# Patient Record
Sex: FEMALE | Race: WHITE | NOT HISPANIC OR LATINO | Employment: FULL TIME | URBAN - METROPOLITAN AREA
[De-identification: names, ages, dates, MRNs, and addresses within clinical notes are randomized per-mention and may not be internally consistent; named-entity substitution may affect disease eponyms.]

---

## 2023-09-18 ENCOUNTER — HOSPITAL ENCOUNTER (OUTPATIENT)
Dept: DATA CONVERSION | Facility: HOSPITAL | Age: 38
Discharge: HOME | End: 2023-09-18
Payer: COMMERCIAL

## 2023-09-18 DIAGNOSIS — E78.5 HYPERLIPIDEMIA, UNSPECIFIED: ICD-10-CM

## 2023-09-18 LAB
APPEARANCE PLAS: NORMAL
CHOLEST SERPL-MCNC: 194 MG/DL (ref 133–200)
CHOLEST/HDLC SERPL: 3.5 RATIO
COLOR SPUN FLD: NORMAL
FASTING STATUS PATIENT QL REPORTED: NORMAL
HDLC SERPL-MCNC: 55 MG/DL
LDLC SERPL CALC-MCNC: 112 MG/DL (ref 65–130)
TRIGL SERPL-MCNC: 137 MG/DL (ref 40–150)

## 2023-11-24 DIAGNOSIS — G43.829 MENSTRUAL MIGRAINE, NOT INTRACTABLE, WITHOUT STATUS MIGRAINOSUS: ICD-10-CM

## 2023-11-24 RX ORDER — SUMATRIPTAN SUCCINATE 100 MG/1
TABLET ORAL
Qty: 27 TABLET | Refills: 1 | Status: SHIPPED | OUTPATIENT
Start: 2023-11-24 | End: 2024-04-01 | Stop reason: SDUPTHER

## 2024-01-08 ENCOUNTER — TELEPHONE (OUTPATIENT)
Dept: PRIMARY CARE | Facility: CLINIC | Age: 39
End: 2024-01-08
Payer: COMMERCIAL

## 2024-01-08 NOTE — TELEPHONE ENCOUNTER
"Pt states she has an order for labs for her 4/1/24 CPE.  Asking if can get additional labs to \"check hormones.\"  States she is perimenopausal and is getting arthritis that she believes may be related to hormones.  Please advise.  Ph: [phone]      "

## 2024-03-22 PROBLEM — R00.2 PALPITATIONS: Status: ACTIVE | Noted: 2024-03-22

## 2024-03-22 PROBLEM — J45.20 MILD INTERMITTENT ASTHMA WITHOUT COMPLICATION (HHS-HCC): Status: ACTIVE | Noted: 2024-03-22

## 2024-03-22 PROBLEM — G43.829 MENSTRUAL MIGRAINE WITHOUT STATUS MIGRAINOSUS, NOT INTRACTABLE: Status: ACTIVE | Noted: 2024-03-22

## 2024-03-22 PROBLEM — E78.00 PURE HYPERCHOLESTEROLEMIA: Status: ACTIVE | Noted: 2024-03-22

## 2024-03-22 PROBLEM — E55.9 VITAMIN D DEFICIENCY: Status: ACTIVE | Noted: 2024-03-22

## 2024-03-22 PROBLEM — E66.9 OBESITY: Status: ACTIVE | Noted: 2024-03-22

## 2024-03-22 PROBLEM — I49.1 PAC (PREMATURE ATRIAL CONTRACTION): Status: ACTIVE | Noted: 2024-03-22

## 2024-03-22 PROBLEM — I49.3 PREMATURE VENTRICULAR CONTRACTIONS: Status: ACTIVE | Noted: 2024-03-22

## 2024-03-22 PROBLEM — E03.9 HYPOTHYROIDISM: Status: ACTIVE | Noted: 2024-03-22

## 2024-03-22 RX ORDER — LORATADINE 10 MG/1
TABLET ORAL
COMMUNITY

## 2024-03-22 RX ORDER — ALBUTEROL SULFATE 90 UG/1
AEROSOL, METERED RESPIRATORY (INHALATION) EVERY 4 HOURS
COMMUNITY

## 2024-03-22 RX ORDER — NORETHINDRONE ACETATE AND ETHINYL ESTRADIOL AND FERROUS FUMARATE 1MG-20(21)
1 KIT ORAL
COMMUNITY
Start: 2023-05-08

## 2024-03-22 RX ORDER — FLUTICASONE PROPIONATE 50 MCG
SPRAY, SUSPENSION (ML) NASAL
COMMUNITY

## 2024-03-22 RX ORDER — HYDROCORTISONE, IODOQUINOL 10; 10 MG/G; MG/G
CREAM TOPICAL
COMMUNITY
Start: 2023-04-25 | End: 2024-04-01 | Stop reason: ALTCHOICE

## 2024-03-25 ENCOUNTER — LAB (OUTPATIENT)
Dept: LAB | Facility: LAB | Age: 39
End: 2024-03-25
Payer: COMMERCIAL

## 2024-03-25 DIAGNOSIS — E55.9 VITAMIN D DEFICIENCY, UNSPECIFIED: ICD-10-CM

## 2024-03-25 DIAGNOSIS — R73.9 HYPERGLYCEMIA, UNSPECIFIED: ICD-10-CM

## 2024-03-25 DIAGNOSIS — Z01.89 ENCOUNTER FOR OTHER SPECIFIED SPECIAL EXAMINATIONS: Primary | ICD-10-CM

## 2024-03-25 DIAGNOSIS — R39.9 UNSPECIFIED SYMPTOMS AND SIGNS INVOLVING THE GENITOURINARY SYSTEM: ICD-10-CM

## 2024-03-25 DIAGNOSIS — E78.00 PURE HYPERCHOLESTEROLEMIA, UNSPECIFIED: ICD-10-CM

## 2024-03-25 DIAGNOSIS — E03.9 HYPOTHYROIDISM, UNSPECIFIED: ICD-10-CM

## 2024-03-25 LAB
25(OH)D3 SERPL-MCNC: 35 NG/ML (ref 31–100)
ALBUMIN SERPL-MCNC: 4.3 G/DL (ref 3.5–5)
ALP BLD-CCNC: 63 U/L (ref 35–125)
ALT SERPL-CCNC: 15 U/L (ref 5–40)
ANION GAP SERPL CALC-SCNC: 13 MMOL/L
APPEARANCE UR: CLEAR
AST SERPL-CCNC: 14 U/L (ref 5–40)
BASOPHILS # BLD AUTO: 0.02 X10*3/UL (ref 0–0.1)
BASOPHILS NFR BLD AUTO: 0.4 %
BILIRUB DIRECT SERPL-MCNC: <0.2 MG/DL (ref 0–0.2)
BILIRUB SERPL-MCNC: 0.3 MG/DL (ref 0.1–1.2)
BILIRUB UR STRIP.AUTO-MCNC: NEGATIVE MG/DL
BUN SERPL-MCNC: 17 MG/DL (ref 8–25)
CALCIUM SERPL-MCNC: 9.3 MG/DL (ref 8.5–10.4)
CHLORIDE SERPL-SCNC: 106 MMOL/L (ref 97–107)
CHOLEST SERPL-MCNC: 202 MG/DL (ref 133–200)
CHOLEST/HDLC SERPL: 3.2 {RATIO}
CO2 SERPL-SCNC: 23 MMOL/L (ref 24–31)
COLOR UR: YELLOW
CREAT SERPL-MCNC: 1 MG/DL (ref 0.4–1.6)
EGFRCR SERPLBLD CKD-EPI 2021: 74 ML/MIN/1.73M*2
EOSINOPHIL # BLD AUTO: 0.06 X10*3/UL (ref 0–0.7)
EOSINOPHIL NFR BLD AUTO: 1.1 %
ERYTHROCYTE [DISTWIDTH] IN BLOOD BY AUTOMATED COUNT: 12.1 % (ref 11.5–14.5)
EST. AVERAGE GLUCOSE BLD GHB EST-MCNC: 103 MG/DL
GLUCOSE SERPL-MCNC: 103 MG/DL (ref 65–99)
GLUCOSE UR STRIP.AUTO-MCNC: NORMAL MG/DL
HBA1C MFR BLD: 5.2 %
HCT VFR BLD AUTO: 41.2 % (ref 36–46)
HDLC SERPL-MCNC: 63 MG/DL
HGB BLD-MCNC: 13.1 G/DL (ref 12–16)
IMM GRANULOCYTES # BLD AUTO: 0.01 X10*3/UL (ref 0–0.7)
IMM GRANULOCYTES NFR BLD AUTO: 0.2 % (ref 0–0.9)
KETONES UR STRIP.AUTO-MCNC: NEGATIVE MG/DL
LDLC SERPL CALC-MCNC: 116 MG/DL (ref 65–130)
LEUKOCYTE ESTERASE UR QL STRIP.AUTO: NEGATIVE
LYMPHOCYTES # BLD AUTO: 2.06 X10*3/UL (ref 1.2–4.8)
LYMPHOCYTES NFR BLD AUTO: 36.3 %
MCH RBC QN AUTO: 28.8 PG (ref 26–34)
MCHC RBC AUTO-ENTMCNC: 31.8 G/DL (ref 32–36)
MCV RBC AUTO: 91 FL (ref 80–100)
MONOCYTES # BLD AUTO: 0.45 X10*3/UL (ref 0.1–1)
MONOCYTES NFR BLD AUTO: 7.9 %
NEUTROPHILS # BLD AUTO: 3.07 X10*3/UL (ref 1.2–7.7)
NEUTROPHILS NFR BLD AUTO: 54.1 %
NITRITE UR QL STRIP.AUTO: NEGATIVE
NRBC BLD-RTO: 0 /100 WBCS (ref 0–0)
PH UR STRIP.AUTO: 5.5 [PH]
PLATELET # BLD AUTO: 246 X10*3/UL (ref 150–450)
POTASSIUM SERPL-SCNC: 4.2 MMOL/L (ref 3.4–5.1)
PROT SERPL-MCNC: 7.1 G/DL (ref 5.9–7.9)
PROT UR STRIP.AUTO-MCNC: NEGATIVE MG/DL
RBC # BLD AUTO: 4.55 X10*6/UL (ref 4–5.2)
RBC # UR STRIP.AUTO: NEGATIVE /UL
SODIUM SERPL-SCNC: 142 MMOL/L (ref 133–145)
SP GR UR STRIP.AUTO: 1.02
TRIGL SERPL-MCNC: 114 MG/DL (ref 40–150)
TSH SERPL DL<=0.05 MIU/L-ACNC: 2.62 MIU/L (ref 0.27–4.2)
UROBILINOGEN UR STRIP.AUTO-MCNC: NORMAL MG/DL
WBC # BLD AUTO: 5.7 X10*3/UL (ref 4.4–11.3)

## 2024-03-25 PROCEDURE — 80053 COMPREHEN METABOLIC PANEL: CPT

## 2024-03-25 PROCEDURE — 80061 LIPID PANEL: CPT

## 2024-03-25 PROCEDURE — 84443 ASSAY THYROID STIM HORMONE: CPT

## 2024-03-25 PROCEDURE — 36415 COLL VENOUS BLD VENIPUNCTURE: CPT

## 2024-03-25 PROCEDURE — 83036 HEMOGLOBIN GLYCOSYLATED A1C: CPT

## 2024-03-25 PROCEDURE — 85025 COMPLETE CBC W/AUTO DIFF WBC: CPT

## 2024-03-25 PROCEDURE — 81003 URINALYSIS AUTO W/O SCOPE: CPT

## 2024-03-25 PROCEDURE — 82306 VITAMIN D 25 HYDROXY: CPT

## 2024-03-25 PROCEDURE — 82248 BILIRUBIN DIRECT: CPT

## 2024-03-27 PROBLEM — E66.01 MORBID OBESITY (MULTI): Status: ACTIVE | Noted: 2024-03-22

## 2024-03-31 ASSESSMENT — PROMIS GLOBAL HEALTH SCALE
EMOTIONAL_PROBLEMS: OFTEN
RATE_SOCIAL_SATISFACTION: GOOD
RATE_PHYSICAL_HEALTH: GOOD
RATE_MENTAL_HEALTH: GOOD
CARRYOUT_SOCIAL_ACTIVITIES: VERY GOOD
CARRYOUT_PHYSICAL_ACTIVITIES: COMPLETELY
RATE_GENERAL_HEALTH: GOOD
RATE_AVERAGE_FATIGUE: SEVERE
RATE_AVERAGE_PAIN: 0
RATE_QUALITY_OF_LIFE: GOOD

## 2024-04-01 ENCOUNTER — OFFICE VISIT (OUTPATIENT)
Dept: PRIMARY CARE | Facility: CLINIC | Age: 39
End: 2024-04-01
Payer: COMMERCIAL

## 2024-04-01 ENCOUNTER — LAB (OUTPATIENT)
Dept: LAB | Facility: LAB | Age: 39
End: 2024-04-01
Payer: COMMERCIAL

## 2024-04-01 VITALS
OXYGEN SATURATION: 100 % | DIASTOLIC BLOOD PRESSURE: 80 MMHG | HEART RATE: 65 BPM | TEMPERATURE: 97.8 F | WEIGHT: 177 LBS | HEIGHT: 64 IN | SYSTOLIC BLOOD PRESSURE: 124 MMHG | BODY MASS INDEX: 30.22 KG/M2

## 2024-04-01 DIAGNOSIS — Z00.00 ENCOUNTER FOR WELLNESS EXAMINATION: Primary | ICD-10-CM

## 2024-04-01 DIAGNOSIS — Z01.89 ENCOUNTER FOR ROUTINE LABORATORY TESTING: ICD-10-CM

## 2024-04-01 DIAGNOSIS — E55.9 VITAMIN D DEFICIENCY: ICD-10-CM

## 2024-04-01 DIAGNOSIS — N92.6 IRREGULAR MENSES: ICD-10-CM

## 2024-04-01 DIAGNOSIS — Z11.59 NEED FOR HEPATITIS C SCREENING TEST: ICD-10-CM

## 2024-04-01 DIAGNOSIS — J45.20 MILD INTERMITTENT ASTHMA WITHOUT COMPLICATION (HHS-HCC): ICD-10-CM

## 2024-04-01 DIAGNOSIS — L30.9 ECZEMA, UNSPECIFIED TYPE: ICD-10-CM

## 2024-04-01 DIAGNOSIS — R73.01 ELEVATED FASTING GLUCOSE: ICD-10-CM

## 2024-04-01 DIAGNOSIS — Z23 NEED FOR HEPATITIS B VACCINATION: ICD-10-CM

## 2024-04-01 DIAGNOSIS — Z12.31 ENCOUNTER FOR SCREENING MAMMOGRAM FOR BREAST CANCER: ICD-10-CM

## 2024-04-01 DIAGNOSIS — G43.829 MENSTRUAL MIGRAINE WITHOUT STATUS MIGRAINOSUS, NOT INTRACTABLE: ICD-10-CM

## 2024-04-01 DIAGNOSIS — G43.829 MENSTRUAL MIGRAINE, NOT INTRACTABLE, WITHOUT STATUS MIGRAINOSUS: ICD-10-CM

## 2024-04-01 DIAGNOSIS — E78.00 PURE HYPERCHOLESTEROLEMIA: ICD-10-CM

## 2024-04-01 PROBLEM — E03.9 HYPOTHYROIDISM: Status: RESOLVED | Noted: 2024-03-22 | Resolved: 2024-04-01

## 2024-04-01 LAB
HCV AB SER QL: NONREACTIVE
PROGEST SERPL-MCNC: <0.3 NG/ML
PROLACTIN SERPL-MCNC: 6.1 UG/L (ref 3–20)

## 2024-04-01 PROCEDURE — 86803 HEPATITIS C AB TEST: CPT

## 2024-04-01 PROCEDURE — 99395 PREV VISIT EST AGE 18-39: CPT | Performed by: INTERNAL MEDICINE

## 2024-04-01 PROCEDURE — 90739 HEPB VACC 2/4 DOSE ADULT IM: CPT | Performed by: INTERNAL MEDICINE

## 2024-04-01 PROCEDURE — 36415 COLL VENOUS BLD VENIPUNCTURE: CPT

## 2024-04-01 PROCEDURE — 84144 ASSAY OF PROGESTERONE: CPT

## 2024-04-01 PROCEDURE — 84146 ASSAY OF PROLACTIN: CPT

## 2024-04-01 PROCEDURE — 82672 ASSAY OF ESTROGEN: CPT

## 2024-04-01 PROCEDURE — 1036F TOBACCO NON-USER: CPT | Performed by: INTERNAL MEDICINE

## 2024-04-01 RX ORDER — SUMATRIPTAN SUCCINATE 100 MG/1
TABLET ORAL
Qty: 27 TABLET | Refills: 1 | Status: SHIPPED | OUTPATIENT
Start: 2024-04-01

## 2024-04-01 RX ORDER — ACETAMINOPHEN 500 MG
2000 TABLET ORAL DAILY
Qty: 30 CAPSULE | Refills: 11 | Status: SHIPPED | OUTPATIENT
Start: 2024-04-01 | End: 2025-04-01

## 2024-04-01 ASSESSMENT — PATIENT HEALTH QUESTIONNAIRE - PHQ9
SUM OF ALL RESPONSES TO PHQ9 QUESTIONS 1 AND 2: 1
1. LITTLE INTEREST OR PLEASURE IN DOING THINGS: NOT AT ALL
2. FEELING DOWN, DEPRESSED OR HOPELESS: SEVERAL DAYS

## 2024-04-01 ASSESSMENT — PAIN SCALES - GENERAL: PAINLEVEL: 0-NO PAIN

## 2024-04-01 NOTE — PROGRESS NOTES
[facility]: [facility]  PHYSICAL EXAM      Susana Ames is a 38 y.o. female that is presenting today for Annual Exam (Rash on both forearms).    Assessment/Plan   Diagnoses and all orders for this visit:  Encounter for wellness examination     Stable overall, Discussed BW and /or DI results and answered all questions Updated HM - Vacc   Pure hypercholesterolemia     Diet controlled with excellent results  -     Lipid Panel; Future  Elevated fasting glucose     Low CH diet was advised   -     Hemoglobin A1C; Future  -     TSH with reflex to Free T4 if abnormal; Future  Mild intermittent asthma without complication     Under control with current treatment   Continue the same   No refills today still has 3 inhaler  Menstrual migraine, not intractable, without status migrainosus     Under control with current treatment   Continue the same   Rx E-scripted 90 days x 1  -     SUMAtriptan (Imitrex) 100 mg tablet; 1 TABLET AT LEAST 2 HOURS BETWEEN DOSES AS NEEDED ORALLY TWICE A DAY AS NEEDED 90 DAYS  Irregular menses  -     Prolactin level; Future  -     Estrogens, Total; Future  -     Progesterone; Future  Eczema, unspecified type     Recurrent bilateral forearms Eczema despite Tx ( seeing Dipika) will refer to [facility] for 2nd opinion  -     Referral to Dermatology  Need for hepatitis C screening test  -     Hepatitis C antibody; Future  Need for hepatitis B vaccination  -     Hepatitis B vaccine, 18yrs and older (HEPLISAV)  Encounter for routine laboratory testing  -     Comprehensive Metabolic Panel; Future  -     CBC and Auto Differential; Future  -     Lipid Panel; Future  -     Hemoglobin A1C; Future  -     Vitamin D 25-Hydroxy,Total (for eval of Vitamin D levels); Future  -     TSH with reflex to Free T4 if abnormal; Future  Vitamin D deficiency  -     cholecalciferol (Vitamin D3) 50 mcg (2,000 unit) capsule; Take 1 capsule (50 mcg) by mouth once daily. Start only after finishing the kelechi  dose  -     Vitamin D 25-Hydroxy,Total (for eval of Vitamin D levels); Future  Encounter for screening mammogram for breast cancer  -     BI mammo bilateral screening tomosynthesis; Future  Other orders  -     Follow Up In Primary Care; Future  Subjective   - Patient is here today for annual physical been doing well overall but feels all her hormone are imbalanced     - Patient denies any other symptoms or concerns at this time.off balance     - patient denies any adverse reactions to or concerns with his/her meds.      Review of Systems   All pertinent POSITIVES as noted per HPI.  All other systems have been reviewed and are NEGATIVE and /or Noncontributory to this patient current visit or complaint.    Objective   Vitals:    04/01/24 0757   BP: 124/80   Pulse: 65   Temp: 36.6 °C (97.8 °F)   SpO2: 100%     Body mass index is 30.38 kg/m².  Physical Exam  Vitals and nursing note reviewed.   Constitutional:       Appearance: Normal appearance.   HENT:      Head: Normocephalic and atraumatic.      Right Ear: Tympanic membrane, ear canal and external ear normal.      Left Ear: Tympanic membrane, ear canal and external ear normal.      Nose: Nose normal.      Mouth/Throat:      Mouth: Mucous membranes are moist.      Pharynx: Oropharynx is clear.   Eyes:      Extraocular Movements: Extraocular movements intact.      Conjunctiva/sclera: Conjunctivae normal.      Pupils: Pupils are equal, round, and reactive to light.   Neck:      Vascular: No carotid bruit.   Cardiovascular:      Rate and Rhythm: Normal rate and regular rhythm.      Pulses: Normal pulses.      Heart sounds: Normal heart sounds.   Pulmonary:      Effort: Pulmonary effort is normal.      Breath sounds: Normal breath sounds.   Abdominal:      General: Abdomen is flat. Bowel sounds are normal.      Palpations: Abdomen is soft.   Musculoskeletal:         General: No swelling. Normal range of motion.      Cervical back: Normal range of motion and neck supple.  "  Lymphadenopathy:      Cervical: No cervical adenopathy.   Skin:     General: Skin is warm and dry.          Neurological:      General: No focal deficit present.      Mental Status: She is alert and oriented to person, place, and time. Mental status is at baseline.   Psychiatric:         Mood and Affect: Mood normal.         Behavior: Behavior normal.     Diagnostic Results   Lab Results   Component Value Date    GLUCOSE 103 (H) 03/25/2024    CALCIUM 9.3 03/25/2024     03/25/2024    K 4.2 03/25/2024    CO2 23 (L) 03/25/2024     03/25/2024    BUN 17 03/25/2024    CREATININE 1.00 03/25/2024     Lab Results   Component Value Date    ALT 15 03/25/2024    AST 14 03/25/2024    ALKPHOS 63 03/25/2024    BILITOT 0.3 03/25/2024     Lab Results   Component Value Date    WBC 5.7 03/25/2024    HGB 13.1 03/25/2024    HCT 41.2 03/25/2024    MCV 91 03/25/2024     03/25/2024     Lab Results   Component Value Date    CHOL 202 (H) 03/25/2024    CHOL 194 09/18/2023    CHOL 249 (H) 03/10/2023     Lab Results   Component Value Date    HDL 63.0 03/25/2024    HDL 55 09/18/2023    HDL 65 03/10/2023     Lab Results   Component Value Date    LDLCALC 116 03/25/2024    LDLCALC 112 09/18/2023    LDLCALC 168 (H) 03/10/2023     Lab Results   Component Value Date    TRIG 114 03/25/2024    TRIG 137 09/18/2023    TRIG 80 03/10/2023     No components found for: \"CHOLHDL\"  Lab Results   Component Value Date    HGBA1C 5.2 03/25/2024     Other labs not included in the list above were reviewed either before or during this encounter.    History   History reviewed. No pertinent past medical history.  History reviewed. No pertinent surgical history.  No family history on file.  Social History     Socioeconomic History    Marital status:      Spouse name: Not on file    Number of children: Not on file    Years of education: Not on file    Highest education level: Not on file   Occupational History    Not on file   Tobacco Use    " Smoking status: Never     Passive exposure: Never    Smokeless tobacco: Never   Vaping Use    Vaping Use: Never used   Substance and Sexual Activity    Alcohol use: Yes    Drug use: Never    Sexual activity: Not on file   Other Topics Concern    Not on file   Social History Narrative    Not on file     Social Determinants of Health     Financial Resource Strain: Not on file   Food Insecurity: Not on file   Transportation Needs: Not on file   Physical Activity: Not on file   Stress: Not on file   Social Connections: Not on file   Intimate Partner Violence: Not on file   Housing Stability: Not on file     Allergies   Allergen Reactions    Pollen Extracts Itching     Current Outpatient Medications on File Prior to Visit   Medication Sig Dispense Refill    albuterol 90 mcg/actuation inhaler every 4 hours.      fluticasone (Flonase Allergy Relief) 50 mcg/actuation nasal spray 1 spray in each nostril Nasally Once a day as needed      Evon Fe 1/20, 28, 1 mg-20 mcg (21)/75 mg (7) tablet Take 1 tablet by mouth once daily.      loratadine (Claritin) 10 mg tablet 1 tablet Orally Once a day prn      multivit-min/iron/folic acid/K (ADULTS MULTIVITAMIN ORAL) once every 24 hours.      SUMAtriptan (Imitrex) 100 mg tablet 1 TABLET AT LEAST 2 HOURS BETWEEN DOSES AS NEEDED ORALLY TWICE A DAY AS NEEDED 90 DAYS 27 tablet 1    [DISCONTINUED] hydrocortisone-iodoquinoL (Dermazene) 1-1 % cream cream USE TWICE A DAY TO AFFECTED AREAS       No current facility-administered medications on file prior to visit.     Immunization History   Administered Date(s) Administered    Flu vaccine (IIV4), preservative free *Check age/dose* 10/26/2016, 10/21/2021, 10/28/2022    Influenza, Unspecified 10/19/2023    Influenza, injectable, quadrivalent 10/01/2020    Pfizer Purple Cap SARS-CoV-2 02/26/2021, 03/26/2021, 12/21/2021    Tdap vaccine, age 7 year and older (BOOSTRIX, ADACEL) 01/10/2015, 10/25/2016     Patient's medical history was reviewed and  updated either before or during this encounter.       Padilla Osborn MD

## 2024-04-06 LAB — ESTROGEN SERPL-MCNC: 59 PG/ML

## 2024-04-16 ENCOUNTER — LAB (OUTPATIENT)
Dept: LAB | Facility: LAB | Age: 39
End: 2024-04-16
Payer: COMMERCIAL

## 2024-04-16 DIAGNOSIS — L93.2 OTHER LOCAL LUPUS ERYTHEMATOSUS: Primary | ICD-10-CM

## 2024-04-16 LAB
ALBUMIN SERPL-MCNC: 4.6 G/DL (ref 3.5–5)
ALP BLD-CCNC: 66 U/L (ref 35–125)
ALT SERPL-CCNC: 20 U/L (ref 5–40)
ANION GAP SERPL CALC-SCNC: 14 MMOL/L
APPEARANCE UR: CLEAR
AST SERPL-CCNC: 19 U/L (ref 5–40)
BASOPHILS # BLD AUTO: 0.02 X10*3/UL (ref 0–0.1)
BASOPHILS NFR BLD AUTO: 0.3 %
BILIRUB SERPL-MCNC: 0.3 MG/DL (ref 0.1–1.2)
BILIRUB UR STRIP.AUTO-MCNC: NEGATIVE MG/DL
BUN SERPL-MCNC: 15 MG/DL (ref 8–25)
CALCIUM SERPL-MCNC: 9.6 MG/DL (ref 8.5–10.4)
CHLORIDE SERPL-SCNC: 102 MMOL/L (ref 97–107)
CO2 SERPL-SCNC: 23 MMOL/L (ref 24–31)
COLOR UR: YELLOW
CREAT SERPL-MCNC: 1.1 MG/DL (ref 0.4–1.6)
EGFRCR SERPLBLD CKD-EPI 2021: 66 ML/MIN/1.73M*2
EOSINOPHIL # BLD AUTO: 0.06 X10*3/UL (ref 0–0.7)
EOSINOPHIL NFR BLD AUTO: 0.9 %
ERYTHROCYTE [DISTWIDTH] IN BLOOD BY AUTOMATED COUNT: 12 % (ref 11.5–14.5)
GLUCOSE SERPL-MCNC: 88 MG/DL (ref 65–99)
GLUCOSE UR STRIP.AUTO-MCNC: NORMAL MG/DL
HCT VFR BLD AUTO: 41.2 % (ref 36–46)
HGB BLD-MCNC: 13.2 G/DL (ref 12–16)
IMM GRANULOCYTES # BLD AUTO: 0.01 X10*3/UL (ref 0–0.7)
IMM GRANULOCYTES NFR BLD AUTO: 0.2 % (ref 0–0.9)
KETONES UR STRIP.AUTO-MCNC: NEGATIVE MG/DL
LEUKOCYTE ESTERASE UR QL STRIP.AUTO: NEGATIVE
LYMPHOCYTES # BLD AUTO: 2.2 X10*3/UL (ref 1.2–4.8)
LYMPHOCYTES NFR BLD AUTO: 34.2 %
MCH RBC QN AUTO: 29.5 PG (ref 26–34)
MCHC RBC AUTO-ENTMCNC: 32 G/DL (ref 32–36)
MCV RBC AUTO: 92 FL (ref 80–100)
MONOCYTES # BLD AUTO: 0.42 X10*3/UL (ref 0.1–1)
MONOCYTES NFR BLD AUTO: 6.5 %
MUCOUS THREADS #/AREA URNS AUTO: NORMAL /LPF
NEUTROPHILS # BLD AUTO: 3.72 X10*3/UL (ref 1.2–7.7)
NEUTROPHILS NFR BLD AUTO: 57.9 %
NITRITE UR QL STRIP.AUTO: NEGATIVE
NRBC BLD-RTO: 0 /100 WBCS (ref 0–0)
PH UR STRIP.AUTO: 7 [PH]
PLATELET # BLD AUTO: 255 X10*3/UL (ref 150–450)
POTASSIUM SERPL-SCNC: 4.3 MMOL/L (ref 3.4–5.1)
PROT SERPL-MCNC: 7.4 G/DL (ref 5.9–7.9)
PROT UR STRIP.AUTO-MCNC: NORMAL MG/DL
RBC # BLD AUTO: 4.48 X10*6/UL (ref 4–5.2)
RBC # UR STRIP.AUTO: NEGATIVE /UL
RBC #/AREA URNS AUTO: NORMAL /HPF
SODIUM SERPL-SCNC: 139 MMOL/L (ref 133–145)
SP GR UR STRIP.AUTO: 1.03
SQUAMOUS #/AREA URNS AUTO: NORMAL /HPF
UROBILINOGEN UR STRIP.AUTO-MCNC: NORMAL MG/DL
WBC # BLD AUTO: 6.4 X10*3/UL (ref 4.4–11.3)
WBC #/AREA URNS AUTO: NORMAL /HPF

## 2024-04-16 PROCEDURE — 86147 CARDIOLIPIN ANTIBODY EA IG: CPT

## 2024-04-16 PROCEDURE — 86235 NUCLEAR ANTIGEN ANTIBODY: CPT

## 2024-04-16 PROCEDURE — 86038 ANTINUCLEAR ANTIBODIES: CPT

## 2024-04-16 PROCEDURE — 80053 COMPREHEN METABOLIC PANEL: CPT

## 2024-04-16 PROCEDURE — 86160 COMPLEMENT ANTIGEN: CPT

## 2024-04-16 PROCEDURE — 85025 COMPLETE CBC W/AUTO DIFF WBC: CPT

## 2024-04-16 PROCEDURE — 86225 DNA ANTIBODY NATIVE: CPT

## 2024-04-16 PROCEDURE — 85730 THROMBOPLASTIN TIME PARTIAL: CPT

## 2024-04-16 PROCEDURE — 81001 URINALYSIS AUTO W/SCOPE: CPT

## 2024-04-16 PROCEDURE — 85613 RUSSELL VIPER VENOM DILUTED: CPT

## 2024-04-16 PROCEDURE — 36415 COLL VENOUS BLD VENIPUNCTURE: CPT

## 2024-04-17 LAB
ANA SER QL HEP2 SUBST: NEGATIVE
C3 SERPL-MCNC: 151 MG/DL (ref 87–200)
C4 SERPL-MCNC: 35 MG/DL (ref 10–50)
CARDIOLIPIN IGA SERPL-ACNC: <0.5 APL U/ML
CARDIOLIPIN IGG SER IA-ACNC: <1.6 GPL U/ML
CARDIOLIPIN IGM SER IA-ACNC: 0.7 MPL U/ML
DSDNA AB SER-ACNC: 13 IU/ML
ENA RNP AB SER IA-ACNC: <0.2 AI
ENA SM AB SER IA-ACNC: <0.2 AI
ENA SS-A AB SER IA-ACNC: <0.2 AI
ENA SS-B AB SER IA-ACNC: <0.2 AI
RIBOSOMAL P AB SER-ACNC: <0.2 AI

## 2024-04-18 LAB
DRVVT SCREEN TO CONFIRM RATIO: 1.24 RATIO
DRVVT/DRVVT CFM NRMLZD PPP-RTO: 1.02 RATIO
DRVVT/DRVVT CFM P DOAC NEUT NORM PPP-RTO: 1.22 RATIO
LA 2 SCREEN W REFLEX-IMP: ABNORMAL
NORMALIZED SCT PPP-RTO: 0.98 RATIO
SILICA CLOTTING TIME CONFIRMATION: 0.93 RATIO
SILICA CLOTTING TIME SCREEN: 0.91 RATIO

## 2024-04-23 LAB — HISTONE IGG SER IA-ACNC: 1.1 UNITS (ref 0–0.9)

## 2024-05-01 ENCOUNTER — CLINICAL SUPPORT (OUTPATIENT)
Dept: PRIMARY CARE | Facility: CLINIC | Age: 39
End: 2024-05-01
Payer: COMMERCIAL

## 2024-05-01 DIAGNOSIS — Z23 ENCOUNTER FOR IMMUNIZATION: ICD-10-CM

## 2024-05-01 PROCEDURE — 90471 IMMUNIZATION ADMIN: CPT | Performed by: INTERNAL MEDICINE

## 2024-05-01 PROCEDURE — 90739 HEPB VACC 2/4 DOSE ADULT IM: CPT | Performed by: INTERNAL MEDICINE

## 2024-05-21 ENCOUNTER — HOSPITAL ENCOUNTER (OUTPATIENT)
Dept: RADIOLOGY | Facility: CLINIC | Age: 39
Discharge: HOME | End: 2024-05-21
Payer: COMMERCIAL

## 2024-05-21 DIAGNOSIS — M25.50 PAIN IN UNSPECIFIED JOINT: ICD-10-CM

## 2024-05-21 DIAGNOSIS — M54.9 DORSALGIA, UNSPECIFIED: ICD-10-CM

## 2024-05-21 PROCEDURE — 72110 X-RAY EXAM L-2 SPINE 4/>VWS: CPT

## 2024-05-21 PROCEDURE — 72110 X-RAY EXAM L-2 SPINE 4/>VWS: CPT | Performed by: RADIOLOGY

## 2024-05-21 PROCEDURE — 73521 X-RAY EXAM HIPS BI 2 VIEWS: CPT

## 2024-05-21 PROCEDURE — 73521 X-RAY EXAM HIPS BI 2 VIEWS: CPT | Mod: BILATERAL PROCEDURE | Performed by: RADIOLOGY

## 2025-02-13 ENCOUNTER — HOSPITAL ENCOUNTER (OUTPATIENT)
Dept: RADIOLOGY | Facility: CLINIC | Age: 40
Discharge: HOME | End: 2025-02-13
Payer: COMMERCIAL

## 2025-02-13 DIAGNOSIS — M25.561 PAIN IN RIGHT KNEE: ICD-10-CM

## 2025-02-13 DIAGNOSIS — M45.0 ANKYLOSING SPONDYLITIS OF MULTIPLE SITES IN SPINE (MULTI): ICD-10-CM

## 2025-02-13 DIAGNOSIS — M25.562 PAIN IN LEFT KNEE: ICD-10-CM

## 2025-02-13 PROCEDURE — 73562 X-RAY EXAM OF KNEE 3: CPT | Mod: 50

## 2025-02-24 ASSESSMENT — ENCOUNTER SYMPTOMS
CARDIOVASCULAR NEGATIVE: 1
MYALGIAS: 1
CONSTITUTIONAL NEGATIVE: 1
ARTHRALGIAS: 1
RESPIRATORY NEGATIVE: 1

## 2025-02-24 NOTE — PROGRESS NOTES
"New patient  History Of Present Illness  Susana Ames is a 39 y.o. female presenting with BILATERAL knee pain (LEFT worse than RIGHT). Unknown trauma or injury. Pt has been treated in the past two years for different autoimmune diseases. In January, when she followed up with the rheumatologist, they noted that her patellar tendons aren't tracking correctly. She has been wearing a compression sleeves on both knees. She has also been doing home physical therapy exercises the last month (since beginning of the year) that was given to her by her rheumatologist. Pain is located on the anterior aspect of the knee along the patella, patellar tendon and distal quad. Notes cracking all the time. She does experience swelling more so LEFT than RIGHT the longer she is on her feet or moving around. Denies any numbness or tingling. She does state that every now and then her knees will \"give out on her\". No previous injury of injury. Rates current pain as a 2/10 sitting and at worst a 8-9/10.  We discussed treatment options.  Patient has been having ongoing bilateral knee pain for the last couple years with no significant relief.  Patient is being followed by rheumatology who is evaluating her for any underlying autoimmune conditions.  Patient has completed physical therapy and continues to have ongoing issues.  As such after discussion we will order an MRI of bilateral knees to evaluate for any underlying soft tissue injury such as meniscus or ligament issues as well as any occult fractures.  Patient can follow-up after MRI and we can adjust treatment as indicated at that time with consideration of referral to orthopedic surgery as necessary.  Patient verbalizes understanding and agreement with plan of care.    Past Medical History  She has no past medical history on file.    Surgical History  She has no past surgical history on file.     Social History  She reports that she has never smoked. She has never been exposed to tobacco " "smoke. She has never used smokeless tobacco. She reports current alcohol use. She reports that she does not use drugs.    Family History  No family history on file.     Allergies  Pollen extracts    Review of Systems  Review of Systems   Constitutional: Negative.    Respiratory: Negative.     Cardiovascular: Negative.    Musculoskeletal:  Positive for arthralgias and myalgias.   All other systems reviewed and are negative.    Last Recorded Vitals  /78 (BP Location: Right arm, Patient Position: Sitting, BP Cuff Size: Adult)   Pulse 65   Ht 1.626 m (5' 4\")   Wt 80.3 kg (177 lb)   BMI 30.38 kg/m²      The , AMIE ESTELLA was present during today's visit and not limited to physical examination!    Examination:  BILATERAL Knee  Edema: Positive Diffusely.   Effusion: Negative.   Percussion Test:  Positive   Tuning Fork Test:  Positive   Ecchymosis/Bruising: Negative.            Palpation:   Positive Tender to palpation anterior aspect of the knee, patellar tendon, patella, distal quad    Orientation:  Asymmetrical: because of the swelling.            Range of Motion: Positive   Knee Flexion ( 0-135 degrees) Decreased because of pain   Knee Extension ( 0-15 degrees)        Muscle Strength: Positive   +4/+5 Quadricep Extension Causes pain  +4/+5  Hamstring Flexion Causes pain          +5+5 Gastrocnemius  +5+5 Soleus.            Proprioception:        Pain with Squat: Positive    Pain with Sumo Squat:  Positive    Hop Test: Positive    Single leg balance test Positive            Vascular:   +2/+4 Femoral  +2/+4 Dorsalis Pedis  +2/+4 Posterior Tibial  Capillary Refill less than 2 seconds.     Knee - ACL:  Anterior Drawer Test: Positive   Lachman Test: Negative      Lelli Test:  Negative      KNEE - MCL / LCL:  Valgus Stress Test:  90 degrees: Positive 20-30 degrees: Positive  Varus Stress Test:  90 degrees: Negative 20-30 degrees: Negative  Apley Distraction Test: Positive  Thessaly Test: Positive    "     KNEE - IT BAND:  Arabella Test: Negative.   Noble Test: Negative.         KNEE - MENISCUS:  Apley Compression Test: Positive    Stienmann Test:  Positive    Diane Test: Positive   Bounce Home Test:  Negative.    Thessaly Test:  Positive            KNEE - PATELLA:  Apprehension Test:  Positive   Glide Test:  Positive   Grind Test: Positive   Patella Tracking Test: Positive     KNEE - PCL/POSTERIOR LATERAL CORNER:  Posterior Drawer Test: Negative  Dial Test: Negative  Reverse Lachman Test: Negative     KNEE - POPLITEUS:  Ricky's Test: Positive               KNEE - QUADRICEPS:    VMO Test: Positive    VLO Test:  Negative.         Hip/Pelvis - Sacrum:  Leg Length Supine: Positive Will verify with standing erect pelvis xray   Leg Length Supine to Seated (Derbolowsky Sign): Positive Will verify with standing erect pelvis xray  Standing Flexion Test: Negative  Seated Flexion Test: Negative  Spring Test: Negative  Sacral Somatic Dysfunction: Negative  Hip Flexor Tightness: Positive   Hamstring Tightness: Positive         Feet/Foot: Positive BILATERAL Valgus foot     Imaging and Diagnostics Review:  Plain radiographs ordered and independently reviewed.  No acute fractures or dislocations noted although patient does have a leg length discrepancy as noted below.  Right leg shorter than the Left leg by the following        Iliac Crest  7 mm         Sacral Base  4 mm         Femoral heads  7 mm  Median difference of Right leg shorter than the Left leg is  6 mm   Assessment   1. Pain in both knees, unspecified chronicity    2. Primary osteoarthritis of both knees    3. Patellar maltracking, left    4. Patellar maltracking, right    5. Patellar tendinitis of both knees    6. Injury of meniscus of knee, left, initial encounter    7. Injury of meniscus of knee, right, initial encounter    8. Leg length discrepancy        Plan   Treatment or Intervention:  May use PRICE therapy as needed.  Stressed the importance of wearing  shoes with good stability control to help with the biomechanics affecting the lower legs  Stressed the importance of wearing full foot insoles to help with the biomechanics affecting the lower legs  Recommendation over-the-counter calcium 500mg, 3 times a day with vitamin-D3 7820-8394+ units a day with food as well as a daily multivitamin  Recommendation over-the-counter Move Free for joint health  May take OTC Tylenol Extra Strength or OTC Tylenol Arthritis, taking one every 6-8 hours with food as needed for pain management.  Patient advised regarding the risk and/or potential adverse reactions and/or side effects of any prescribed medications along with any over-the-counter medications or any supplements used. Patient advised to seek immediate medical care if any adverse reactions occur. The patient and/or patient(s) parent(s) verbalized their understanding.  Discussed in detail with the patient to the level of their understanding the possibility in the future of regenerative injections versus corticosteroids injections  MRI of BILATERAL KNEE to rule out tendon vs ligament vs tear vs fracture vs other, MSK to read.   Patient was given a REACTION brace for their BILATERAL injury/condition. The patient is ambulatory with or without aid and feels more stable with the brace on. The brace definitely helps improve their function as well as stability. Verbal and written instructions for the use, wear schedule, cleaning, and application of this brace were given. Patient was instructed that should the brace result in increased pain, decreased sensation, numbness and/or tingling, increased swelling, or an overall worsening of their medical condition; to please contact our office immediately. Orthotic/brace management and training was provided for skin care, modifications due to healing tissues, edema changes, interruption in skin integrity, and safety precautions with the Orthosis/brace.    Diagnostic studies:  XR knee 3  views bilateral  Narrative: Interpreted By:  Floyd Hagan,   STUDY:  XR KNEE 3 VIEWS BILATERAL; ;  2/13/2025 9:31 am      INDICATION:  Signs/Symptoms:pain.      COMPARISON:  None.      ACCESSION NUMBER(S):  [id number]      ORDERING CLINICIAN:  KIM COE      TECHNIQUE:  6 radiographs of both knees are performed.      FINDINGS:  Early osteoarthritic changes are identified with tiny marginal  osteophytes at multiple sites bilaterally. There is no fracture or  dislocation. The joint spaces are otherwise preserved. There is no  bone destruction or abnormal periosteal reaction. No calcified or  ossified loose bodies are identified.      Impression: Early osteoarthritic changes of both knees.      No sign of acute osseous or articular abnormality.          MACRO:  None      Signed by: Floyd Hagan 2/14/2025 4:29 PM  Dictation workstation:   [id number]     Activity Instructions, Restrictions, and Accommodations:      Consultations/Referrals:  Physical therapy    Follow-up:  Follow up after MRI of knees  Total appointment time _45__ minutes. Greater than 50% spent counseling patient on results of physical exam, treatment options as well as reasons for ordered imaging and anticipated treatment for possible results, need for PT and expected outcomes, as well as discussing possible medications.     Eris Davila CNP

## 2025-02-26 ENCOUNTER — HOSPITAL ENCOUNTER (OUTPATIENT)
Dept: RADIOLOGY | Facility: CLINIC | Age: 40
Discharge: HOME | End: 2025-02-26
Payer: COMMERCIAL

## 2025-02-26 ENCOUNTER — OFFICE VISIT (OUTPATIENT)
Dept: SPORTS MEDICINE | Facility: CLINIC | Age: 40
End: 2025-02-26
Payer: COMMERCIAL

## 2025-02-26 VITALS
BODY MASS INDEX: 30.22 KG/M2 | HEIGHT: 64 IN | WEIGHT: 177 LBS | HEART RATE: 65 BPM | DIASTOLIC BLOOD PRESSURE: 78 MMHG | SYSTOLIC BLOOD PRESSURE: 120 MMHG

## 2025-02-26 DIAGNOSIS — S83.8X1A INJURY OF MENISCUS OF KNEE, RIGHT, INITIAL ENCOUNTER: ICD-10-CM

## 2025-02-26 DIAGNOSIS — M76.51 PATELLAR TENDINITIS OF BOTH KNEES: ICD-10-CM

## 2025-02-26 DIAGNOSIS — M17.0 PRIMARY OSTEOARTHRITIS OF BOTH KNEES: ICD-10-CM

## 2025-02-26 DIAGNOSIS — M25.562 PAIN IN BOTH KNEES, UNSPECIFIED CHRONICITY: ICD-10-CM

## 2025-02-26 DIAGNOSIS — M21.70 LEG LENGTH DISCREPANCY: ICD-10-CM

## 2025-02-26 DIAGNOSIS — S83.8X2A INJURY OF MENISCUS OF KNEE, LEFT, INITIAL ENCOUNTER: ICD-10-CM

## 2025-02-26 DIAGNOSIS — M25.561 PAIN IN BOTH KNEES, UNSPECIFIED CHRONICITY: ICD-10-CM

## 2025-02-26 DIAGNOSIS — M22.8X1 PATELLAR MALTRACKING, RIGHT: ICD-10-CM

## 2025-02-26 DIAGNOSIS — M76.52 PATELLAR TENDINITIS OF BOTH KNEES: ICD-10-CM

## 2025-02-26 DIAGNOSIS — M22.8X2 PATELLAR MALTRACKING, LEFT: ICD-10-CM

## 2025-02-26 PROCEDURE — 99214 OFFICE O/P EST MOD 30 MIN: CPT | Performed by: NURSE PRACTITIONER

## 2025-02-26 PROCEDURE — 72170 X-RAY EXAM OF PELVIS: CPT

## 2025-02-26 PROCEDURE — 99204 OFFICE O/P NEW MOD 45 MIN: CPT | Performed by: NURSE PRACTITIONER

## 2025-02-26 PROCEDURE — 1036F TOBACCO NON-USER: CPT | Performed by: NURSE PRACTITIONER

## 2025-02-26 PROCEDURE — 3008F BODY MASS INDEX DOCD: CPT | Performed by: NURSE PRACTITIONER

## 2025-02-26 PROCEDURE — L1812 KO ELASTIC W/JOINTS PRE OTS: HCPCS | Performed by: NURSE PRACTITIONER

## 2025-02-26 ASSESSMENT — PAIN SCALES - GENERAL: PAINLEVEL_OUTOF10: 8

## 2025-02-26 NOTE — PATIENT INSTRUCTIONS
May use PRICE therapy as needed.  Stressed the importance of wearing shoes with good stability control to help with the biomechanics affecting the lower legs  Stressed the importance of wearing full foot insoles to help with the biomechanics affecting the lower legs  Recommendation over-the-counter calcium 500mg, 3 times a day with vitamin-D3 1264-4216+ units a day with food as well as a daily multivitamin  Recommendation over-the-counter Move Free for joint health  May take OTC Tylenol Extra Strength or OTC Tylenol Arthritis, taking one every 6-8 hours with food as needed for pain management.  Patient advised regarding the risk and/or potential adverse reactions and/or side effects of any prescribed medications along with any over-the-counter medications or any supplements used. Patient advised to seek immediate medical care if any adverse reactions occur. The patient and/or patient(s) parent(s) verbalized their understanding.  Discussed in detail with the patient to the level of their understanding the possibility in the future of regenerative injections versus corticosteroids injections  MRI of LEFT/RIGHT/BILATERAL KNEE to rule out tendon vs ligament vs tear vs fracture vs other, MSK to read.   Patient was given a REACTION brace for their BILATERAL injury/condition. The patient is ambulatory with or without aid and feels more stable with the brace on. The brace definitely helps improve their function as well as stability. Verbal and written instructions for the use, wear schedule, cleaning, and application of this brace were given. Patient was instructed that should the brace result in increased pain, decreased sensation, numbness and/or tingling, increased swelling, or an overall worsening of their medical condition; to please contact our office immediately. Orthotic/brace management and training was provided for skin care, modifications due to healing tissues, edema changes, interruption in skin integrity, and  safety precautions with the Orthosis/brace.   Follow up after MRI of BILATERAL knee    You have been ordered an MRI of the BILATERAL KNEE. Once you contact scheduling at [phone] and obtain the date and time of your MRI contact our office at [phone] to schedule your follow-up appointment to review your results.

## 2025-03-05 ENCOUNTER — HOSPITAL ENCOUNTER (OUTPATIENT)
Dept: RADIOLOGY | Facility: CLINIC | Age: 40
Discharge: HOME | End: 2025-03-05
Payer: COMMERCIAL

## 2025-03-05 DIAGNOSIS — M76.52 PATELLAR TENDINITIS OF BOTH KNEES: ICD-10-CM

## 2025-03-05 DIAGNOSIS — M25.562 PAIN IN BOTH KNEES, UNSPECIFIED CHRONICITY: ICD-10-CM

## 2025-03-05 DIAGNOSIS — M17.0 PRIMARY OSTEOARTHRITIS OF BOTH KNEES: ICD-10-CM

## 2025-03-05 DIAGNOSIS — M25.561 PAIN IN BOTH KNEES, UNSPECIFIED CHRONICITY: ICD-10-CM

## 2025-03-05 DIAGNOSIS — M21.70 LEG LENGTH DISCREPANCY: ICD-10-CM

## 2025-03-05 DIAGNOSIS — M22.8X1 PATELLAR MALTRACKING, RIGHT: ICD-10-CM

## 2025-03-05 DIAGNOSIS — M76.51 PATELLAR TENDINITIS OF BOTH KNEES: ICD-10-CM

## 2025-03-05 DIAGNOSIS — S83.8X2A INJURY OF MENISCUS OF KNEE, LEFT, INITIAL ENCOUNTER: ICD-10-CM

## 2025-03-05 DIAGNOSIS — M22.8X2 PATELLAR MALTRACKING, LEFT: ICD-10-CM

## 2025-03-05 DIAGNOSIS — S83.8X1A INJURY OF MENISCUS OF KNEE, RIGHT, INITIAL ENCOUNTER: ICD-10-CM

## 2025-03-05 PROCEDURE — 73721 MRI JNT OF LWR EXTRE W/O DYE: CPT | Mod: RT

## 2025-03-05 PROCEDURE — 73721 MRI JNT OF LWR EXTRE W/O DYE: CPT | Mod: LT

## 2025-03-07 ASSESSMENT — ENCOUNTER SYMPTOMS
RESPIRATORY NEGATIVE: 1
CARDIOVASCULAR NEGATIVE: 1
MYALGIAS: 1
CONSTITUTIONAL NEGATIVE: 1
ARTHRALGIAS: 1

## 2025-03-07 NOTE — PROGRESS NOTES
Established patient  History Of Present Illness  Susana Ames is a 39 y.o. female presenting for her MRI follow up of her BILATERAL knee pain (LEFT worse than RIGHT). Since last visit in office, patient states that she feels slightly improved. Rates current pain as a 1/10.  Reviewed patient MRI results in detail.  Patient verbalizes understanding of results.  We discussed treatment options and patient states that she has been noticing some improvement with the bilateral reaction braces which she has been wearing.  Patient has not started into physical therapy yet but states that she will schedule this as soon as possible.  After discussion we will have patient fitted with an OA unloading brace of the right side as she had this more moderate degenerative joint disease of the medial compartment on the right side.  We discussed further treatment options such as regenerative versus therapeutic injections but patient would like to hold off on injections for now until such time as she is completed some physical therapy.  We can revisit this subject at her follow-up appointment with consideration of further treatment options at that time.  Patient verbalizes understanding and agreement with plan of care.    Past Medical History  She has no past medical history on file.    Surgical History  She has no past surgical history on file.     Social History  She reports that she has never smoked. She has never been exposed to tobacco smoke. She has never used smokeless tobacco. She reports current alcohol use. She reports that she does not use drugs.    Family History  No family history on file.     Allergies  Pollen extracts    Review of Systems  Review of Systems   Constitutional: Negative.    Respiratory: Negative.     Cardiovascular: Negative.    Musculoskeletal:  Positive for arthralgias and myalgias.   All other systems reviewed and are negative.    Last Recorded Vitals  /78 (BP Location: Right arm, Patient Position:  "Sitting, BP Cuff Size: Adult)   Pulse 65   Ht 1.626 m (5' 4\")   Wt 80.3 kg (177 lb)   LMP 02/27/2025   BMI 30.38 kg/m²      The , AMIE SORIA was present during today's visit and not limited to physical examination!    Examination:  BILATERAL Knee  Edema: Negative.    Effusion: Negative.   Percussion Test: Negative.   Tuning Fork Test:  Negative.   Ecchymosis/Bruising: Negative.            Palpation:   Positive Tender to palpation anterior aspect of the knee, patellar tendon,     Orientation:  Negative.            Range of Motion:   Knee Flexion ( 0-135 degrees)    Knee Extension ( 0-15 degrees)        Muscle Strength:    +5/+5 Quadricep Extension   +5/+5  Hamstring Flexion          +5+5 Gastrocnemius  +5+5 Soleus.            Proprioception:        Pain with Squat: Positive    Pain with Sumo Squat:  Negative.   Hop Test: Positive    Single leg balance test Negative.            Vascular:   +2/+4 Femoral  +2/+4 Dorsalis Pedis  +2/+4 Posterior Tibial  Capillary Refill less than 2 seconds.     Knee - ACL:  Anterior Drawer Test: Positive   Lachman Test: Negative      Lelli Test:  Negative      KNEE - MCL / LCL:  Valgus Stress Test:  90 degrees: Negative.  20-30 degrees: Negative.   Varus Stress Test:  90 degrees: Negative 20-30 degrees: Negative  Apley Distraction Test: Negative.   Thessaly Test: Negative.       KNEE - IT BAND:  Arabella Test: Negative.   Noble Test: Negative.         KNEE - MENISCUS:  Apley Compression Test: Negative.   Stienmann Test:  Positive    Diane Test: Negative.   Bounce Home Test:  Negative.    Thessaly Test:  Negative.            KNEE - PATELLA:  Apprehension Test:  Negative.   Glide Test:  Positive   Grind Test: Negative.   Patella Tracking Test: Positive     KNEE - PCL/POSTERIOR LATERAL CORNER:  Posterior Drawer Test: Negative  Dial Test: Negative  Reverse Lachman Test: Negative     KNEE - POPLITEUS:  Ricky's Test: Negative.               KNEE - QUADRICEPS:    VMO Test: " Negative.    VLO Test:  Negative.         Hip/Pelvis - Sacrum:  Leg Length Supine: Positive Will verify with standing erect pelvis xray   Leg Length Supine to Seated (Derbolowsky Sign): Positive Will verify with standing erect pelvis xray  Standing Flexion Test: Negative  Seated Flexion Test: Negative  Spring Test: Negative  Sacral Somatic Dysfunction: Negative  Hip Flexor Tightness: Positive   Hamstring Tightness: Positive         Feet/Foot: Positive BILATERAL Valgus foot     Imaging and Diagnostics Review:  Plain radiographs ordered and independently reviewed.  No acute fractures or dislocations noted although patient does have a leg length discrepancy as noted below.  Right leg shorter than the Left leg by the following        Iliac Crest  7 mm         Sacral Base  4 mm         Femoral heads  7 mm  Median difference of Right leg shorter than the Left leg is  6 mm   Assessment   1. Osteoarthritis of right knee, unspecified osteoarthritis type    2. Popliteal cyst, right    3. Pain in both knees, unspecified chronicity    4. Primary osteoarthritis of both knees    5. Patellar maltracking, right    6. Patellar tendinitis of both knees    7. Patellar maltracking, left    8. Leg length discrepancy          Plan   Treatment or Intervention:  May use PRICE therapy as needed.  Again stressed the importance of wearing shoes with good stability control to help with the biomechanics affecting the lower legs  Again stressed the importance of wearing full foot insoles to help with the biomechanics affecting the lower legs  Recommendation over-the-counter calcium 500mg, 3 times a day with vitamin-D3 0342-7930+ units a day with food as well as a daily multivitamin  Recommendation over-the-counter Move Free for joint health  May take OTC Tylenol Extra Strength or OTC Tylenol Arthritis, taking one every 6-8 hours with food as needed for pain management.  Patient advised regarding the risk and/or potential adverse reactions and/or  side effects of any prescribed medications along with any over-the-counter medications or any supplements used. Patient advised to seek immediate medical care if any adverse reactions occur. The patient and/or patient(s) parent(s) verbalized their understanding.  Discussed in detail with the patient to the level of their understanding the possibility in the future of regenerative injections versus corticosteroids injections  Reviewed LEFT AND RIGHT KNEE MRI in detail with the patient and/or patients parent/legal guardian to their level of understanding; a copy of these results were provided to the patient and/or patients parent/legal guardian at the time of this office visit.   Patient to continue REACTION brace for their BILATERAL injury/condition.   Bracing rep to reach out about OA unloading brace    Diagnostic studies:  MR knee right wo IV contrast  Narrative: Interpreted By:  Jeffry Atkinson and Ogievich Taessa   STUDY: MRI of the  right knee without IV contrast;  3/5/2025 10:30 am      INDICATION: Signs/Symptoms:RIGHT KNEE PAIN.      COMPARISON: None.      ACCESSION NUMBER(S): [id number]      ORDERING CLINICIAN: KULWANT OREILLY      TECHNIQUE: MR imaging of the  right knee was obtained  without IV contrast.      FINDINGS:  LIGAMENTS AND TENDONS:  The anterior cruciate ligament and the posterior cruciate ligaments  are intact. The medial collateral ligament is intact. The lateral  collateral ligament, the biceps femoris tendon, the popliteus tendon  and the iliotibial band are intact. The quadriceps tendon and the  patellar tendon are intact.      MENISCI:  The medial meniscus is intact and without evidence of tear.  The lateral meniscus is intact and without evidence of tear.      JOINTS:  Moderate thinning of the articular cartilage of the medial femoral  condyle with a small focus of full-thickness fissuring and medial  tibial plateau (series 4, image 24). The articular cartilage of the  lateral  femoral condyle and lateral tibial plateau is intact and  without evidence of full thickness defect. The patellofemoral  articulation is intact and without evidence of subluxation or  articular cartilage defect (the medial condylar cartilage fissuring  discussed above is felt to be near the junction lower femoral  trochlea.      No evidence of significant joint effusion or popliteal cyst.      There is a 2.6 x 1.7 x 3.3 cm T2 hyperintense cystic lesion in the  posteromedial popliteal fossa (series 5, image 8) consistent with  popliteal cyst.      OSSEOUS STRUCTURES:  Subchondral cystic change of the medial femoral condyle (series 4,  image 24).      No focal marrow replacing lesions are identified. There is no  fracture.      SOFT TISSUES:  There is no muscle atrophy or tear.  The common peroneal nerve is intact.      Impression:   1. Mild-to-moderate degenerative changes of the medial knee joint.  Otherwise, no MRI evidence of internal derangement of the right knee.  2. Moderate-sized popliteal cyst.      I personally reviewed the images/study and I agree with the findings  as stated by Riley Dickson DO, PGY-3. This study was interpreted  at [place], [place].      MACRO:  None      Signed by: Jeffry Atkinson 3/5/2025 11:29 AM  Dictation workstation:   [id number]  ------------------------------  MR knee left wo IV contrast  Narrative: Interpreted By:  Jeffry Atkinson and Ogievich Taessa   STUDY: MRI of the  left knee without IV contrast;  3/5/2025 10:30 am      INDICATION: Signs/Symptoms:LEFT KNEE PAIN      COMPARISON: Bilateral knee radiographs 02/13/2025.      ACCESSION NUMBER(S): [id number]      ORDERING CLINICIAN: KULWANT OREILLY      TECHNIQUE: MR imaging of the  left knee was obtained  without IV contrast.      FINDINGS:  LIGAMENTS AND TENDONS:  The anterior cruciate ligament and the posterior cruciate ligaments  are intact. The medial collateral ligament  is intact. The lateral  collateral ligament, the biceps femoris tendon, the popliteus tendon  and the iliotibial band are intact. The quadriceps tendon and the  patellar tendon are intact.      MENISCI:  The medial meniscus is intact and without evidence of tear.  The lateral meniscus is intact and without evidence of tear.      JOINTS:  Mild thinning with fibrillation of the articular cartilage of the  medial femoral condyle and medial tibial plateau (series 4, image  18). The articular cartilage of the lateral femoral condyle and  lateral tibial plateau is intact and without evidence of full  thickness defect. There is a questionable fissure of the lateral  aspect of the patellar articular cartilage (series 5, image 10).  Otherwise, the patellofemoral articulation is intact and without  evidence of subluxation.      No evidence of significant joint effusion or popliteal cyst.      OSSEOUS STRUCTURES:  No focal marrow replacing lesions are identified. There is no  fracture.      SOFT TISSUES:  There is no muscle atrophy or tear.  The common peroneal nerve is intact.      Impression: Mild osteoarthrosis of the medial knee joint space. Otherwise, no MRI  evidence of internal derangement of the left knee.      I personally reviewed the images/study and I agree with the findings  as stated by Riley Dickson DO, PGY-3. This study was interpreted  at [facility], [place], [place].      MACRO:  None      Signed by: Jeffry Atkinson 3/5/2025 11:27 AM  Dictation workstation:   [id number]     Activity Instructions, Restrictions, and Accommodations:      Consultations/Referrals:  Physical therapy    Follow-up:  Follow up 8 weeks  Total appointment time _30_ minutes. Greater than 50% spent counseling patient on results of physical exam, treatment options as well as results of ordered imaging and treatment for results, need for PT and expected outcomes, as well as discussing possible  medications.    Eris Davila CNP

## 2025-03-07 NOTE — PATIENT INSTRUCTIONS
May use PRICE therapy as needed.  Again stressed the importance of wearing shoes with good stability control to help with the biomechanics affecting the lower legs  Again stressed the importance of wearing full foot insoles to help with the biomechanics affecting the lower legs  Recommendation over-the-counter calcium 500mg, 3 times a day with vitamin-D3 8081-9771+ units a day with food as well as a daily multivitamin  Recommendation over-the-counter Move Free for joint health  May take OTC Tylenol Extra Strength or OTC Tylenol Arthritis, taking one every 6-8 hours with food as needed for pain management.  Patient advised regarding the risk and/or potential adverse reactions and/or side effects of any prescribed medications along with any over-the-counter medications or any supplements used. Patient advised to seek immediate medical care if any adverse reactions occur. The patient and/or patient(s) parent(s) verbalized their understanding.  Discussed in detail with the patient to the level of their understanding the possibility in the future of regenerative injections versus corticosteroids injections  Reviewed LEFT AND RIGHT KNEE MRI in detail with the patient and/or patients parent/legal guardian to their level of understanding; a copy of these results were provided to the patient and/or patients parent/legal guardian at the time of this office visit.   Patient to continue REACTION brace for their BILATERAL injury/condition.   Bracing rep to reach out in regards to setting up appointment for brace fitting  Follow up 8 weeks

## 2025-03-10 ENCOUNTER — OFFICE VISIT (OUTPATIENT)
Dept: SPORTS MEDICINE | Facility: CLINIC | Age: 40
End: 2025-03-10
Payer: COMMERCIAL

## 2025-03-10 VITALS
DIASTOLIC BLOOD PRESSURE: 78 MMHG | HEIGHT: 64 IN | SYSTOLIC BLOOD PRESSURE: 120 MMHG | BODY MASS INDEX: 30.22 KG/M2 | WEIGHT: 177 LBS | HEART RATE: 65 BPM

## 2025-03-10 DIAGNOSIS — M76.52 PATELLAR TENDINITIS OF BOTH KNEES: ICD-10-CM

## 2025-03-10 DIAGNOSIS — M76.51 PATELLAR TENDINITIS OF BOTH KNEES: ICD-10-CM

## 2025-03-10 DIAGNOSIS — M25.561 PAIN IN BOTH KNEES, UNSPECIFIED CHRONICITY: ICD-10-CM

## 2025-03-10 DIAGNOSIS — M71.21 POPLITEAL CYST, RIGHT: ICD-10-CM

## 2025-03-10 DIAGNOSIS — M17.11 OSTEOARTHRITIS OF RIGHT KNEE, UNSPECIFIED OSTEOARTHRITIS TYPE: ICD-10-CM

## 2025-03-10 DIAGNOSIS — M17.0 PRIMARY OSTEOARTHRITIS OF BOTH KNEES: ICD-10-CM

## 2025-03-10 DIAGNOSIS — M21.70 LEG LENGTH DISCREPANCY: ICD-10-CM

## 2025-03-10 DIAGNOSIS — M22.8X1 PATELLAR MALTRACKING, RIGHT: ICD-10-CM

## 2025-03-10 DIAGNOSIS — M22.8X2 PATELLAR MALTRACKING, LEFT: ICD-10-CM

## 2025-03-10 DIAGNOSIS — M25.562 PAIN IN BOTH KNEES, UNSPECIFIED CHRONICITY: ICD-10-CM

## 2025-03-10 PROCEDURE — 99214 OFFICE O/P EST MOD 30 MIN: CPT | Performed by: NURSE PRACTITIONER

## 2025-03-10 PROCEDURE — 1036F TOBACCO NON-USER: CPT | Performed by: NURSE PRACTITIONER

## 2025-03-10 PROCEDURE — 3008F BODY MASS INDEX DOCD: CPT | Performed by: NURSE PRACTITIONER

## 2025-03-10 ASSESSMENT — ENCOUNTER SYMPTOMS
OCCASIONAL FEELINGS OF UNSTEADINESS: 0
DEPRESSION: 0
LOSS OF SENSATION IN FEET: 0

## 2025-03-10 ASSESSMENT — PATIENT HEALTH QUESTIONNAIRE - PHQ9
2. FEELING DOWN, DEPRESSED OR HOPELESS: NOT AT ALL
SUM OF ALL RESPONSES TO PHQ9 QUESTIONS 1 AND 2: 0
1. LITTLE INTEREST OR PLEASURE IN DOING THINGS: NOT AT ALL

## 2025-03-10 ASSESSMENT — PAIN SCALES - GENERAL: PAINLEVEL_OUTOF10: 1

## 2025-03-25 ENCOUNTER — HOSPITAL ENCOUNTER (OUTPATIENT)
Dept: RADIOLOGY | Facility: CLINIC | Age: 40
Discharge: HOME | End: 2025-03-25
Payer: COMMERCIAL

## 2025-03-25 VITALS — HEIGHT: 64 IN | BODY MASS INDEX: 30.22 KG/M2 | WEIGHT: 177 LBS

## 2025-03-25 DIAGNOSIS — Z12.31 ENCOUNTER FOR SCREENING MAMMOGRAM FOR BREAST CANCER: ICD-10-CM

## 2025-03-25 LAB
ALBUMIN SERPL-MCNC: 4.3 G/DL (ref 3.6–5.1)
ALP SERPL-CCNC: 51 U/L (ref 31–125)
ALT SERPL-CCNC: 19 U/L (ref 6–29)
ANION GAP SERPL CALCULATED.4IONS-SCNC: 8 MMOL/L (CALC) (ref 7–17)
AST SERPL-CCNC: 16 U/L (ref 10–30)
BILIRUB SERPL-MCNC: 0.4 MG/DL (ref 0.2–1.2)
BUN SERPL-MCNC: 12 MG/DL (ref 7–25)
CALCIUM SERPL-MCNC: 9.3 MG/DL (ref 8.6–10.2)
CHLORIDE SERPL-SCNC: 107 MMOL/L (ref 98–110)
CHOLEST SERPL-MCNC: 174 MG/DL
CHOLEST/HDLC SERPL: 3 (CALC)
CO2 SERPL-SCNC: 25 MMOL/L (ref 20–32)
CREAT SERPL-MCNC: 0.99 MG/DL (ref 0.5–0.99)
EGFRCR SERPLBLD CKD-EPI 2021: 74 ML/MIN/1.73M2
GLUCOSE SERPL-MCNC: 88 MG/DL (ref 65–99)
HDLC SERPL-MCNC: 58 MG/DL
LDLC SERPL CALC-MCNC: 97 MG/DL (CALC)
NONHDLC SERPL-MCNC: 116 MG/DL (CALC)
POTASSIUM SERPL-SCNC: 4.6 MMOL/L (ref 3.5–5.3)
PROT SERPL-MCNC: 6.7 G/DL (ref 6.1–8.1)
SODIUM SERPL-SCNC: 140 MMOL/L (ref 135–146)
TRIGL SERPL-MCNC: 91 MG/DL

## 2025-03-25 PROCEDURE — 77063 BREAST TOMOSYNTHESIS BI: CPT

## 2025-03-25 PROCEDURE — 77063 BREAST TOMOSYNTHESIS BI: CPT | Performed by: RADIOLOGY

## 2025-03-25 PROCEDURE — 77067 SCR MAMMO BI INCL CAD: CPT | Performed by: RADIOLOGY

## 2025-03-25 NOTE — PROGRESS NOTES
Physical Therapy Evaluation    Patient Name: Susana Ames  MRN: [id number]  Evaluation Date: 3/26/2025  Time Calculation  Start Time: 1425  Stop Time: 1503  Time Calculation (min): 38 min  PT Evaluation Time Entry  PT Evaluation (Low) Time Entry: 25           Insurance Information:  Problem List Items Addressed This Visit             ICD-10-CM    Pain in both knees - Primary M25.561, M25.562    Relevant Orders    Follow Up In Physical Therapy    Primary osteoarthritis of both knees M17.0    Patellar maltracking, left M22.8X2    Patellar maltracking, right M22.8X1    Patellar tendinitis of both knees M76.51, M76.52    Injury of meniscus of knee, left, initial encounter S83.8X2A    Injury of meniscus of knee, right, initial encounter S83.8X1A    Leg length discrepancy M21.70         Subjective   General: Patient is a 38 yo female with diagnosis of bilateral knee pain (right > left).  Patient began to notice pain bilateral knee in Fall of 2024.   Patient reports ATC at school referred her to sports medicine.  Patient to rheumatologist 1/2025- diagnosis of PFTS- started HEP-diligent with fair management.  Patient wearing reaction braces bilateral knees- to start with unloading brace on right knee. Patient hoping to manage symptoms with conservative treatment.  Patient wears quality footwear/heel lift right secondary to LLD left.      Precautions:  none     Relevant PMH:  Ankylosing spondylitis  Asthma  OA    Red flags:   none    Pain:  Knee pain   At worst  7/10  Worse with   WB  Stairs    At best  0/10  Topical ream  Ice  Rest      Home Living:  Occupation: full time job doing teacher [employer]   Work tasks: on feet  Hobbies/activities: daughter's soccer/exercise    Prior Function Per Pt/Caregiver Report:  Active female    Imaging:  X-ray bilateral knees  FINDINGS:   Early osteoarthritic changes are identified with tiny marginal   osteophytes at multiple sites bilaterally. There is no fracture or   dislocation. The  joint spaces are otherwise preserved. There is no   bone destruction or abnormal periosteal reaction. No calcified or   ossified loose bodies are identified.   Early osteoarthritic changes of both knees.        IMPRESSION:  1. Mild-to-moderate degenerative changes of the medial knee joint.  Otherwise, no MRI evidence of internal derangement of the right knee.  2. Moderate-sized popliteal cyst.       No sign of acute osseous or articular abnormality.       IMPRESSION:  Mild osteoarthrosis of the medial knee joint space. Otherwise, no MRI  evidence of internal derangement of the left knee.    Objective   Posture:  Good arches  LLD left > right   3 mm lift right     Range of Motion:  Knee AROM L R   Extension 0 deg 0 deg   Flexion 135 deg 135 deg     Crepitus left knee     Strength:  Hip MMT L R   Hip Flexion 5/5 5/5   Hip Extension 5/5 5/5   Hip Abduction 5/5 5/5   Hip Adduction 5/5 5/5     Knee MMT L R   Knee Flexion 5/5 5/5   Knee Extension 5/5 5/5       Flexibility:  HS- NEG  ASHLIE- NEG     Palpation:  Reports pain right/left inferomedial knee pain     Gait:  No gross asymmetries    Transfers:  Sit to stand - no hands    Outcome Measures:  WOMAC  9/96    Assessment  Pt is a 39 y.o. female who presents with impairments of bilateral knee pain. These impairments have led to functional limitations including difficulty with ADLs/recreation involving standing and walking.  Pt would benefit from skilled physical therapy intervention to improve above impairments and facilitate return to function.    Complexity of Evaluation: Low    Based on the history including personal factors and/or comorbidities, examination of body systems including body structures and function, activity limitations, and/or participation restrictions, as well as clinical presentation, patient meets criteria for above complexity evaluation.    Plan  1 week  Up to 6 visits  Therapeutic exercise  Aquatics  Manual      Insurance Plan: Payor: [employer] [place] / Plan: [employer] / Product Type: *No Product type* /     Plan for next visit:   Initiate aquatics    OP EDUCATION:  Verbal review of HEP issued by rheumatologist  SHANNAN Crowell, etc...    Orientation to aquatics    Today's Treatment:  Orientation to aquatics  HEP to be completed daily, exercises include:  See education above    Goals:  STG(3 visits)  Patient to tolerate 40 minutes of aquatic PT    LTG(6 visits)  WOMAC to < 5% impaired  Patient to perform ADLs/recreation  with pain < 2/10  Patient to walk for 1 hour on varied terrain without pain <2/10

## 2025-03-26 ENCOUNTER — EVALUATION (OUTPATIENT)
Dept: PHYSICAL THERAPY | Facility: CLINIC | Age: 40
End: 2025-03-26
Payer: COMMERCIAL

## 2025-03-26 DIAGNOSIS — M21.70 LEG LENGTH DISCREPANCY: ICD-10-CM

## 2025-03-26 DIAGNOSIS — M22.8X1 PATELLAR MALTRACKING, RIGHT: ICD-10-CM

## 2025-03-26 DIAGNOSIS — M17.0 PRIMARY OSTEOARTHRITIS OF BOTH KNEES: ICD-10-CM

## 2025-03-26 DIAGNOSIS — M76.51 PATELLAR TENDINITIS OF BOTH KNEES: ICD-10-CM

## 2025-03-26 DIAGNOSIS — S83.8X1A INJURY OF MENISCUS OF KNEE, RIGHT, INITIAL ENCOUNTER: ICD-10-CM

## 2025-03-26 DIAGNOSIS — M22.8X2 PATELLAR MALTRACKING, LEFT: ICD-10-CM

## 2025-03-26 DIAGNOSIS — M76.52 PATELLAR TENDINITIS OF BOTH KNEES: ICD-10-CM

## 2025-03-26 DIAGNOSIS — S83.8X2A INJURY OF MENISCUS OF KNEE, LEFT, INITIAL ENCOUNTER: ICD-10-CM

## 2025-03-26 DIAGNOSIS — M25.562 PAIN IN BOTH KNEES, UNSPECIFIED CHRONICITY: Primary | ICD-10-CM

## 2025-03-26 DIAGNOSIS — M25.561 PAIN IN BOTH KNEES, UNSPECIFIED CHRONICITY: Primary | ICD-10-CM

## 2025-03-26 LAB
25(OH)D3+25(OH)D2 SERPL-MCNC: 66 NG/ML (ref 30–100)
BASOPHILS # BLD AUTO: 20 CELLS/UL (ref 0–200)
BASOPHILS NFR BLD AUTO: 0.4 %
EOSINOPHIL # BLD AUTO: 20 CELLS/UL (ref 15–500)
EOSINOPHIL NFR BLD AUTO: 0.4 %
ERYTHROCYTE [DISTWIDTH] IN BLOOD BY AUTOMATED COUNT: 11.2 % (ref 11–15)
EST. AVERAGE GLUCOSE BLD GHB EST-MCNC: 105 MG/DL
EST. AVERAGE GLUCOSE BLD GHB EST-SCNC: 5.8 MMOL/L
HBA1C MFR BLD: 5.3 % OF TOTAL HGB
HCT VFR BLD AUTO: 40.5 % (ref 35–45)
HGB BLD-MCNC: 13.4 G/DL (ref 11.7–15.5)
LYMPHOCYTES # BLD AUTO: 1637 CELLS/UL (ref 850–3900)
LYMPHOCYTES NFR BLD AUTO: 32.1 %
MCH RBC QN AUTO: 30.1 PG (ref 27–33)
MCHC RBC AUTO-ENTMCNC: 33.1 G/DL (ref 32–36)
MCV RBC AUTO: 91 FL (ref 80–100)
MONOCYTES # BLD AUTO: 316 CELLS/UL (ref 200–950)
MONOCYTES NFR BLD AUTO: 6.2 %
NEUTROPHILS # BLD AUTO: 3106 CELLS/UL (ref 1500–7800)
NEUTROPHILS NFR BLD AUTO: 60.9 %
PLATELET # BLD AUTO: 225 THOUSAND/UL (ref 140–400)
PMV BLD REES-ECKER: 10.5 FL (ref 7.5–12.5)
RBC # BLD AUTO: 4.45 MILLION/UL (ref 3.8–5.1)
TSH SERPL-ACNC: 1.29 MIU/L
WBC # BLD AUTO: 5.1 THOUSAND/UL (ref 3.8–10.8)

## 2025-03-26 PROCEDURE — 97161 PT EVAL LOW COMPLEX 20 MIN: CPT | Mod: GP

## 2025-04-02 ENCOUNTER — APPOINTMENT (OUTPATIENT)
Dept: PRIMARY CARE | Facility: CLINIC | Age: 40
End: 2025-04-02
Payer: COMMERCIAL

## 2025-04-02 ENCOUNTER — APPOINTMENT (OUTPATIENT)
Dept: PHYSICAL THERAPY | Facility: CLINIC | Age: 40
End: 2025-04-02
Payer: COMMERCIAL

## 2025-04-04 ENCOUNTER — APPOINTMENT (OUTPATIENT)
Dept: PRIMARY CARE | Facility: CLINIC | Age: 40
End: 2025-04-04
Payer: COMMERCIAL

## 2025-04-07 NOTE — PROGRESS NOTES
Physical Therapy Treatment    Patient Name: Susana Ames  MRN: [id number]  Encounter date:  4/9/2025  Time Calculation  Start Time: 0800  Stop Time: 0844  Time Calculation (min): 44 min     PT Therapeutic Procedures Time Entry  Aquatic Therapy Time Entry: 43     Visit Number:  2 (including evaluation)  Planned total visits: 10  Visits Authorized/Insurance Coverage:  NO AUTH, 3300 DED, 90% COVERAGE, 40V PT/OT, 4000 OOP, MMO    Current Problem  Problem List Items Addressed This Visit             ICD-10-CM    Pain in both knees M25.561, M25.562       Precautions  None    Pain  1/10     Subjective  General  Patient reports low level pain this morning, R>L, noting pain can increase with excessive or repetitive bending, squatting, or kneeling throughout the day, and can reach 7/10. Patient denies pain with walking on flat ground, but relays pain may increase walking up stairs. Patient notes she has a pool at home and is comfortable in the water. Patient states she has been doing the HEP provided during previous land based PT in 1/2025, wearing patellar tracking reaction braces, and has a final fitting for custom braces this morning after the appointment.     Objective     Patient demonstrates good balance with across pool walking, fair balance and standing posture control with WB therex. Fair core control in non-WB w/ noodle under arms, BUE support on rails during activity, mild forward lean onto noodle while resting    Treatment:  Aquatics:    Patient entered pool via stairs, reciprocal gait, JESSICA support on rails.   Walk across pool for core control, balance and acclimation to water:  forward/backward/side step x 3 laps each    At HR without UE support:    Heel raise x 20 reps  Toe raise x 20 reps  Hip ABD/EXT/SLR x 20 reps  HS curls x 20 reps-  Mini-squats x 20 reps  Hip ER (clam shells) 20 reps- JESSICA on rail    Deep end for decompression with Noodle:  Decompression for core control x 2'  Bicycle x 2', pedaled across  "pool and back 1 lap, - B knee \"crunching\"  Decompression x 2'  Hip ABD/ADD x 2'  Decompression x 2'  Cross country x 2'  Decompression x 2'  Clamshells x 2'   Decompression x 2'    March across pool with kickboard  x 2 laps    At steps:  HS stretch 20 sec x 2 reps R/L LE's    At bench:  LAQ's 2 x 10 reps- Include next visit as time allows    Current HEP:  Aquatics only    Has patient been compliant with HEP? N/A    Activity tolerance:  Good     OP EDUCATION:    Patient was provided handout of HEP exercises to perform in pool at home when they open it for the year.     Assessment:    Pt's response to treatment:  Good. Cueing provided to use abdominals to improve posture in standing during WB therex and decrease forward lean onto noodle in deep end, fair deep water posture control. Patient reported \"crunching\" in B knees during standing HS curls and bicycling, denied pain with activities.     Areas of improvements: Decreased B knee pain following treatment.   Limitations/deficits: Pain increases in the medial aspect of B knees with bending and extending throughout the day.     Pain end of session: 0/10     Plan:    Continue with current POC/no changes    Assessment of current progress against goals:  Progressing toward functional goals and Symptomatic relief with treatment    Goals:   STG(3 visits)  Patient to tolerate 40 minutes of aquatic PT     LTG(6 visits)  WOMAC to < 5% impaired  Patient to perform ADLs/recreation  with pain < 2/10  Patient to walk for 1 hour on varied terrain without pain <2/10  "

## 2025-04-09 ENCOUNTER — TREATMENT (OUTPATIENT)
Dept: PHYSICAL THERAPY | Facility: CLINIC | Age: 40
End: 2025-04-09
Payer: COMMERCIAL

## 2025-04-09 DIAGNOSIS — M25.562 PAIN IN BOTH KNEES, UNSPECIFIED CHRONICITY: ICD-10-CM

## 2025-04-09 DIAGNOSIS — M25.561 PAIN IN BOTH KNEES, UNSPECIFIED CHRONICITY: ICD-10-CM

## 2025-04-09 PROCEDURE — 97113 AQUATIC THERAPY/EXERCISES: CPT | Mod: CQ,GP | Performed by: SPECIALIST/TECHNOLOGIST

## 2025-04-15 ASSESSMENT — ENCOUNTER SYMPTOMS
ARTHRALGIAS: 1
CARDIOVASCULAR NEGATIVE: 1
RESPIRATORY NEGATIVE: 1
CONSTITUTIONAL NEGATIVE: 1
MYALGIAS: 1

## 2025-04-15 NOTE — PROGRESS NOTES
Physical Therapy Treatment    Patient Name: Susana Ames  MRN: [id number]  Encounter date:  4/16/2025  Time Calculation  Start Time: 1445  Stop Time: 1529  Time Calculation (min): 44 min     PT Therapeutic Procedures Time Entry  Aquatic Therapy Time Entry: 44     Visit Number:  3 (including evaluation)  Planned total visits: 10  Visits Authorized/Insurance Coverage:  NO AUTH, 3300 DED, 90% COVERAGE, 40V PT/OT, 4000 OOP, MMO    Current Problem  Problem List Items Addressed This Visit           ICD-10-CM    Pain in both knees M25.561, M25.562         Precautions  None    Pain  0/10     Subjective  General  Pt reports good tolerance to last session and did feel some relief of knee pain while in water, though did experience some DOMS later that day.  Pt reports she did recently receive her custom knee brace for her R leg. .     Objective     Mild lateral tracking and posturing of R and L patella (while on deck of pool)    Treatment:  Aquatics:    Patient entered pool via stairs, reciprocal gait, JESSICA support on rails.   Walk across pool for core control, balance and acclimation to water:  forward/backward/side step x 3 laps each    At HR without UE support:    Heel raise x 20 reps  Toe raise x 20 reps  Hip ABD/EXT/SLR x 20 reps  HS curls x 20 reps-  Mini-squats x 20 reps  Hip ER (clam shells) 20 reps- JESSICA on rail    Deep end for decompression with Noodle:  Decompression for core control x 2'  Bicycle x 2'  Decompression x 2'  Hip ABD/ADD x 2'  Decompression x 2'  Cross [place] x 2'  Decompression x 2'  Clamshells x 2'   Decompression x 2'    March across pool with kickboard  x 2 laps    At steps:  HS stretch 20 sec x 2 reps R/L LE's    At bench:  LAQ's 2 x 10 reps-     Current HEP:  Aquatics only    Has patient been compliant with HEP? N/A    Activity tolerance:  Good     OP EDUCATION:    Patient was provided handout of HEP exercises to perform in pool at home when they open it for the year.     Assessment:    Pt's  response to treatment: excellent;  subtle fatigue., less pain     Areas of improvements: improved core stability/awareness while in deep water  Limitations/deficits: pain with stair ambulation     Pain end of session: 0/10     Plan:    Continue with current POC/no changes    Assessment of current progress against goals:  Progressing toward functional goals and Symptomatic relief with treatment    Goals:   STG(3 visits)  Patient to tolerate 40 minutes of aquatic PT     LTG(6 visits)  WOMAC to < 5% impaired  Patient to perform ADLs/recreation  with pain < 2/10  Patient to walk for 1 hour on varied terrain without pain <2/10

## 2025-04-15 NOTE — PATIENT INSTRUCTIONS
May use PRICE therapy as needed.  Again stressed the importance of wearing shoes with good stability control to help with the biomechanics affecting the lower legs  Again stressed the importance of wearing full foot insoles to help with the biomechanics affecting the lower legs  Recommendation over-the-counter calcium 500mg, 3 times a day with vitamin-D3 3619-1681+ units a day with food as well as a daily multivitamin  Recommendation over-the-counter Move Free for joint health  May take OTC Tylenol Extra Strength or OTC Tylenol Arthritis, taking one every 6-8 hours with food as needed for pain management.  Patient advised regarding the risk and/or potential adverse reactions and/or side effects of any prescribed medications along with any over-the-counter medications or any supplements used. Patient advised to seek immediate medical care if any adverse reactions occur. The patient and/or patient(s) parent(s) verbalized their understanding.  Discussed in detail with the patient to the level of their understanding the possibility in the future of regenerative injections versus corticosteroids injections  Patient to continue REACTION brace for their BILATERAL injury/condition.   Continue with physical therapy  Follow up as needed

## 2025-04-15 NOTE — PROGRESS NOTES
"Established patient  History Of Present Illness  Susana Ames is a 39 y.o. female presenting for her follow up of her BILATERAL knee pain (LEFT worse than RIGHT). Since last visit in office, patient states that she feels improved. Rates current pain as a 0/10. Presents wearing custom knee brace on her RIGHT knee for support. She continues with physical therapy as well as home exercises and has noticed improvement.  We discussed treatment options.  Patient states that she is feeling well and denies any current pain at this time.  Patient has been wearing her knee braces as previously prescribed and tolerating them well.  As such after discussion we will clear patient to return to activity without restriction and she can follow-up as needed for continued or further complaints of pain or disability.  Patient verbalizes understanding and agreement with plan of care.    Past Medical History  She has no past medical history of Personal history of irradiation.    Surgical History  She has no past surgical history on file.     Social History  She reports that she has never smoked. She has never been exposed to tobacco smoke. She has never used smokeless tobacco. She reports current alcohol use. She reports that she does not use drugs.    Family History  Family History   Problem Relation Name Age of Onset    Breast cancer Maternal Grandmother Adrianne Lewis      Allergies  Pollen extracts    Review of Systems  Review of Systems   Constitutional: Negative.    Respiratory: Negative.     Cardiovascular: Negative.    Musculoskeletal:  Positive for arthralgias and myalgias.   All other systems reviewed and are negative.    Last Recorded Vitals  /78 (BP Location: Right arm, Patient Position: Sitting, BP Cuff Size: Adult)   Pulse 68   Ht 1.626 m (5' 4\")   Wt 80.3 kg (177 lb)   BMI 30.38 kg/m²      The , AMIE SORIA was present during today's visit and not limited to physical " examination!    Examination:  BILATERAL Knee  Edema: Negative.    Effusion: Negative.   Percussion Test: Negative.   Tuning Fork Test:  Negative.   Ecchymosis/Bruising: Negative.            Palpation:   Negative.     Orientation:  Negative.            Range of Motion:   Knee Flexion ( 0-135 degrees)    Knee Extension ( 0-15 degrees)        Muscle Strength:    +5/+5 Quadricep Extension   +5/+5  Hamstring Flexion          +5+5 Gastrocnemius  +5+5 Soleus.            Proprioception:        Pain with Squat: Negative.    Pain with Sumo Squat:  Negative.   Hop Test: Negative.   Single leg balance test Negative.            Vascular:   +2/+4 Femoral  +2/+4 Dorsalis Pedis  +2/+4 Posterior Tibial  Capillary Refill less than 2 seconds.     Knee - ACL:  Anterior Drawer Test: Negative.   Lachman Test: Negative      Lelli Test:  Negative      KNEE - MCL / LCL:  Valgus Stress Test:  90 degrees: Negative.  20-30 degrees: Negative.   Varus Stress Test:  90 degrees: Negative 20-30 degrees: Negative  Apley Distraction Test: Negative.   Thessaly Test: Negative.       KNEE - IT BAND:  Arabella Test: Negative.   Noble Test: Negative.         KNEE - MENISCUS:  Apley Compression Test: Negative.   Stienmann Test:  Negative.   Diane Test: Negative.   Bounce Home Test:  Negative.    Thessaly Test:  Negative.            KNEE - PATELLA:  Apprehension Test:  Negative.   Glide Test:  Negative.   Grind Test: Negative.   Patella Tracking Test: Negative.     KNEE - PCL/POSTERIOR LATERAL CORNER:  Posterior Drawer Test: Negative  Dial Test: Negative  Reverse Lachman Test: Negative     KNEE - POPLITEUS:  Ricky's Test: Negative.               KNEE - QUADRICEPS:    VMO Test: Negative.    VLO Test:  Negative.         Hip/Pelvis - Sacrum:  Leg Length Supine: Positive Will verify with standing erect pelvis xray   Leg Length Supine to Seated (Derbolowsky Sign): Positive Will verify with standing erect pelvis xray  Standing Flexion Test: Negative  Seated  Flexion Test: Negative  Spring Test: Negative  Sacral Somatic Dysfunction: Negative  Hip Flexor Tightness: Positive   Hamstring Tightness: Positive         Feet/Foot: Positive BILATERAL Valgus foot     Imaging and Diagnostics Review:  Plain radiographs ordered and independently reviewed.  No acute fractures or dislocations noted although patient does have a leg length discrepancy as noted below.  Right leg shorter than the Left leg by the following        Iliac Crest  7 mm         Sacral Base  4 mm         Femoral heads  7 mm  Median difference of Right leg shorter than the Left leg is  6 mm     Assessment   1. Osteoarthritis of right knee, unspecified osteoarthritis type    2. Popliteal cyst, right    3. Pain in both knees, unspecified chronicity    4. Primary osteoarthritis of both knees    5. Patellar maltracking, right    6. Patellar tendinitis of both knees    7. Patellar maltracking, left    8. Leg length discrepancy        Plan   Treatment or Intervention:  May use PRICE therapy as needed.  Again stressed the importance of wearing shoes with good stability control to help with the biomechanics affecting the lower legs  Again stressed the importance of wearing full foot insoles to help with the biomechanics affecting the lower legs  Recommendation over-the-counter calcium 500mg, 3 times a day with vitamin-D3 9973-7245+ units a day with food as well as a daily multivitamin  Recommendation over-the-counter Move Free for joint health  May take OTC Tylenol Extra Strength or OTC Tylenol Arthritis, taking one every 6-8 hours with food as needed for pain management.  Patient advised regarding the risk and/or potential adverse reactions and/or side effects of any prescribed medications along with any over-the-counter medications or any supplements used. Patient advised to seek immediate medical care if any adverse reactions occur. The patient and/or patient(s) parent(s) verbalized their understanding.  Discussed in  detail with the patient to the level of their understanding the possibility in the future of regenerative injections versus corticosteroids injections  Patient to continue REACTION brace for their BILATERAL injury/condition.   Continue with physical therapy    Diagnostic studies:  MR knee right wo IV contrast  Narrative: Interpreted By:  Jeffry Atkinson,  Neftaly Lin   STUDY: MRI of the  right knee without IV contrast;  3/5/2025 10:30 am      INDICATION: Signs/Symptoms:RIGHT KNEE PAIN.      COMPARISON: None.      ACCESSION NUMBER(S): [id number]      ORDERING CLINICIAN: KULWANT OREILLY      TECHNIQUE: MR imaging of the  right knee was obtained  without IV contrast.      FINDINGS:  LIGAMENTS AND TENDONS:  The anterior cruciate ligament and the posterior cruciate ligaments  are intact. The medial collateral ligament is intact. The lateral  collateral ligament, the biceps femoris tendon, the popliteus tendon  and the iliotibial band are intact. The quadriceps tendon and the  patellar tendon are intact.      MENISCI:  The medial meniscus is intact and without evidence of tear.  The lateral meniscus is intact and without evidence of tear.      JOINTS:  Moderate thinning of the articular cartilage of the medial femoral  condyle with a small focus of full-thickness fissuring and medial  tibial plateau (series 4, image 24). The articular cartilage of the  lateral femoral condyle and lateral tibial plateau is intact and  without evidence of full thickness defect. The patellofemoral  articulation is intact and without evidence of subluxation or  articular cartilage defect (the medial condylar cartilage fissuring  discussed above is felt to be near the junction lower femoral  trochlea.      No evidence of significant joint effusion or popliteal cyst.      There is a 2.6 x 1.7 x 3.3 cm T2 hyperintense cystic lesion in the  posteromedial popliteal fossa (series 5, image 8) consistent with  popliteal cyst.      OSSEOUS  STRUCTURES:  Subchondral cystic change of the medial femoral condyle (series 4,  image 24).      No focal marrow replacing lesions are identified. There is no  fracture.      SOFT TISSUES:  There is no muscle atrophy or tear.  The common peroneal nerve is intact.      Impression:   1. Mild-to-moderate degenerative changes of the medial knee joint.  Otherwise, no MRI evidence of internal derangement of the right knee.  2. Moderate-sized popliteal cyst.      I personally reviewed the images/study and I agree with the findings  as stated by Riley Dickson DO, PGY-3. This study was interpreted  at [place], [place].      MACRO:  None      Signed by: Jeffry Atkinson 3/5/2025 11:29 AM  Dictation workstation:   [id number]  ------------------------------  MR knee left wo IV contrast  Narrative: Interpreted By:  Jeffry Atkinson and Ogievich Taessa   STUDY: MRI of the  left knee without IV contrast;  3/5/2025 10:30 am      INDICATION: Signs/Symptoms:LEFT KNEE PAIN      COMPARISON: Bilateral knee radiographs 02/13/2025.      ACCESSION NUMBER(S): [id number]      ORDERING CLINICIAN: KULWANT OREILLY      TECHNIQUE: MR imaging of the  left knee was obtained  without IV contrast.      FINDINGS:  LIGAMENTS AND TENDONS:  The anterior cruciate ligament and the posterior cruciate ligaments  are intact. The medial collateral ligament is intact. The lateral  collateral ligament, the biceps femoris tendon, the popliteus tendon  and the iliotibial band are intact. The quadriceps tendon and the  patellar tendon are intact.      MENISCI:  The medial meniscus is intact and without evidence of tear.  The lateral meniscus is intact and without evidence of tear.      JOINTS:  Mild thinning with fibrillation of the articular cartilage of the  medial femoral condyle and medial tibial plateau (series 4, image  18). The articular cartilage of the lateral femoral condyle and  lateral tibial  plateau is intact and without evidence of full  thickness defect. There is a questionable fissure of the lateral  aspect of the patellar articular cartilage (series 5, image 10).  Otherwise, the patellofemoral articulation is intact and without  evidence of subluxation.      No evidence of significant joint effusion or popliteal cyst.      OSSEOUS STRUCTURES:  No focal marrow replacing lesions are identified. There is no  fracture.      SOFT TISSUES:  There is no muscle atrophy or tear.  The common peroneal nerve is intact.      Impression: Mild osteoarthrosis of the medial knee joint space. Otherwise, no MRI  evidence of internal derangement of the left knee.      I personally reviewed the images/study and I agree with the findings  as stated by Riley Dickson DO, PGY-3. This study was interpreted  at [facility], [place], [place].      MACRO:  None      Signed by: Jeffry Atkinson 3/5/2025 11:27 AM  Dictation workstation:   [id number]     Activity Instructions, Restrictions, and Accommodations:      Consultations/Referrals:  Physical therapy    Follow-up:  Follow up as needed    Eris Davila CNP

## 2025-04-16 ENCOUNTER — TREATMENT (OUTPATIENT)
Dept: PHYSICAL THERAPY | Facility: CLINIC | Age: 40
End: 2025-04-16
Payer: COMMERCIAL

## 2025-04-16 DIAGNOSIS — M25.562 PAIN IN BOTH KNEES, UNSPECIFIED CHRONICITY: ICD-10-CM

## 2025-04-16 DIAGNOSIS — M25.561 PAIN IN BOTH KNEES, UNSPECIFIED CHRONICITY: ICD-10-CM

## 2025-04-16 PROCEDURE — 97113 AQUATIC THERAPY/EXERCISES: CPT | Mod: GP,CQ

## 2025-04-23 ENCOUNTER — APPOINTMENT (OUTPATIENT)
Dept: PHYSICAL THERAPY | Facility: CLINIC | Age: 40
End: 2025-04-23
Payer: COMMERCIAL

## 2025-04-23 PROBLEM — M45.9 ANKYLOSING SPONDYLITIS: Status: ACTIVE | Noted: 2024-07-25

## 2025-04-23 RX ORDER — HYDROXYCHLOROQUINE SULFATE 200 MG/1
TABLET, FILM COATED ORAL
COMMUNITY
Start: 2025-03-21

## 2025-04-23 RX ORDER — IMIQUIMOD 12.5 MG/.25G
CREAM TOPICAL
COMMUNITY
Start: 2024-10-16

## 2025-04-25 ENCOUNTER — APPOINTMENT (OUTPATIENT)
Dept: PHYSICAL THERAPY | Facility: CLINIC | Age: 40
End: 2025-04-25
Payer: COMMERCIAL

## 2025-04-25 DIAGNOSIS — M25.561 PAIN IN BOTH KNEES, UNSPECIFIED CHRONICITY: ICD-10-CM

## 2025-04-25 DIAGNOSIS — M25.562 PAIN IN BOTH KNEES, UNSPECIFIED CHRONICITY: ICD-10-CM

## 2025-04-25 PROCEDURE — 97113 AQUATIC THERAPY/EXERCISES: CPT | Mod: GP,CQ

## 2025-04-25 NOTE — PROGRESS NOTES
"    Physical Therapy Treatment    Patient Name: Susana Ames  MRN: [id number]  Encounter date:  4/25/2025  Time Calculation  Start Time: 1215  Stop Time: 1300  Time Calculation (min): 45 min     PT Therapeutic Procedures Time Entry  Aquatic Therapy Time Entry: 40     Visit Number:  4 (including evaluation)  Planned total visits: 10  Visits Authorized/Insurance Coverage:  NO AUTH, 3300 DED, 90% COVERAGE, 40V PT/OT, 4000 OOP, MMO    Current Problem  Problem List Items Addressed This Visit           ICD-10-CM    Pain in both knees M25.561, M25.562     Precautions  None    Pain  0/10     Subjective  General  \"At the end of the day, when I  soccer, they are sore. I think it is the unevenness of the grass.\"     Objective  Good core control deep end using noodle and without UE support      Treatment:  Aquatics:  Patient entered pool via stairs, reciprocal gait, JESSICA support on rails.   Walk across pool for core control, balance and acclimation to water:  forward/backward/side step x 3 laps each    With red water weights on surface   -Heel/toe rocks x 20   -Dynamic Hip ABD/EXT/SLR 2x10 R/L   -Marching with red water weights submerged x 2 laps   -Hip flex, knee ext, knee flex, hip ext 2x10  R/L   -Mini-squats 2x10   -Hip ER (clam shells) 2x10 R/L    Deep end for decompression with Noodle:  -Decompression for core control x 2'  -Bicycle fwd x 2'  -Decompression x 2'  -Hip ABD/ADD x 2'  -Decompression x 2'  -Cross country fwd x 2'  Decompression x 1'    Slow return to WB     At steps:  Runner stretch 20 sec x 2 reps R/L LE's    Current HEP:  Aquatics only    Has patient been compliant with HEP? N/A    Activity tolerance:  Good     OP EDUCATION:    Patient was provided handout of HEP exercises to perform in pool at home when they open it for the year.     Assessment:  Red water weights added to shallow end exercises to increase balance challenge. Good core control in deep end with noodle and no UE assist.     Pain end of " session:   0    Plan:    Continue with current POC/no changes    Assessment of current progress against goals:  Progressing toward functional goals and Symptomatic relief with treatment    Goals:   STG(3 visits)  Patient to tolerate 40 minutes of aquatic PT     LTG(6 visits)  WOMAC to < 5% impaired  Patient to perform ADLs/recreation  with pain < 2/10  Patient to walk for 1 hour on varied terrain without pain <2/10

## 2025-04-26 NOTE — PROGRESS NOTES
[facility]: [facility]  PHYSICAL EXAM      Susana Ames is a 39 y.o. female that is presenting today for annual physical exam and management of medical conditions    Assessment/Plan   Assessment & Plan  Annual physical exam  Medications and problem list reconciled  Labs reviewed with the patient today all normal    Mammogram 3/25/2025  Labs 3/25/2025       Pure hypercholesterolemia  Normal lipid panel  Managed with diet       Palpitations  Resolved no longer having any palpitatons       Vitamin D deficiency  Normal vitam d level       Menstrual migraine without status migrainosus, not intractable  Stable  Managed with sumatriptan 100mg daily       Mild intermittent asthma without complication (HHS-HCC)  Stable  No recent flares  Continue albuterol and  flonase as needed  Claritin as needed       Cutaneous lupus erythematosus  Managed with plaquenil 200mg daily  Managed by dermatology Dr. Janusz smith, right  Follows with sports medicine - ABDIEL Smith, CNP  Right knee brace            Subjective   HPI  Review of Systems   Constitutional: Negative.    HENT: Negative.     Eyes: Negative.    Respiratory: Negative.     Cardiovascular: Negative.    Gastrointestinal: Negative.    Endocrine: Negative.    Genitourinary: Negative.    Musculoskeletal:         Right knee brace   Allergic/Immunologic: Negative.    Hematological: Negative.    Psychiatric/Behavioral: Negative.        Objective   Vitals:    04/28/25 1057   BP: 120/73   Pulse: 61   Temp: 36.5 °C (97.7 °F)   SpO2: 100%     Body mass index is 27.81 kg/m².  Physical Exam  Vitals reviewed.   Constitutional:       Appearance: Normal appearance.   HENT:      Head: Normocephalic.      Right Ear: Tympanic membrane and ear canal normal.      Left Ear: Tympanic membrane and ear canal normal.      Nose: Nose normal.      Mouth/Throat:      Mouth: Mucous membranes are moist.      Pharynx: Oropharynx is clear.   Eyes:       "Pupils: Pupils are equal, round, and reactive to light.   Cardiovascular:      Rate and Rhythm: Normal rate and regular rhythm.      Pulses: Normal pulses.      Heart sounds: Normal heart sounds.   Pulmonary:      Effort: Pulmonary effort is normal.   Abdominal:      General: Bowel sounds are normal.   Musculoskeletal:      Cervical back: Normal range of motion.      Comments: Knee brace right knee   Skin:     General: Skin is warm and dry.      Capillary Refill: Capillary refill takes less than 2 seconds.   Neurological:      General: No focal deficit present.      Mental Status: She is alert and oriented to person, place, and time.   Psychiatric:         Mood and Affect: Mood normal.         Behavior: Behavior normal.         Thought Content: Thought content normal.         Judgment: Judgment normal.       Diagnostic Results   Lab Results   Component Value Date    GLUCOSE 88 03/25/2025    CALCIUM 9.3 03/25/2025     03/25/2025    K 4.6 03/25/2025    CO2 25 03/25/2025     03/25/2025    BUN 12 03/25/2025    CREATININE 0.99 03/25/2025     Lab Results   Component Value Date    ALT 19 03/25/2025    AST 16 03/25/2025    ALKPHOS 51 03/25/2025    BILITOT 0.4 03/25/2025     Lab Results   Component Value Date    WBC 5.1 03/25/2025    HGB 13.4 03/25/2025    HCT 40.5 03/25/2025    MCV 91.0 03/25/2025     03/25/2025     Lab Results   Component Value Date    CHOL 174 03/25/2025    CHOL 202 (H) 03/25/2024    CHOL 194 09/18/2023     Lab Results   Component Value Date    HDL 58 03/25/2025    HDL 63.0 03/25/2024    HDL 55 09/18/2023     Lab Results   Component Value Date    LDLCALC 97 03/25/2025    LDLCALC 116 03/25/2024    LDLCALC 112 09/18/2023     Lab Results   Component Value Date    TRIG 91 03/25/2025    TRIG 114 03/25/2024    TRIG 137 09/18/2023     No components found for: \"CHOLHDL\"  Lab Results   Component Value Date    HGBA1C 5.3 03/25/2025     Other labs not included in the list above were reviewed " either before or during this encounter.    History   Medical History[1]  Surgical History[2]  Family History[3]  Social History     Socioeconomic History    Marital status:      Spouse name: Not on file    Number of children: Not on file    Years of education: Not on file    Highest education level: Not on file   Occupational History    Not on file   Tobacco Use    Smoking status: Never     Passive exposure: Never    Smokeless tobacco: Never   Vaping Use    Vaping status: Never Used   Substance and Sexual Activity    Alcohol use: Yes    Drug use: Never    Sexual activity: Not on file   Other Topics Concern    Not on file   Social History Narrative    Not on file     Social Drivers of Health     Financial Resource Strain: Not on file   Food Insecurity: Not on file   Transportation Needs: Not on file   Physical Activity: Not on file   Stress: Not on file   Social Connections: Not on file   Intimate Partner Violence: Not on file   Housing Stability: Not on file     Allergies[4]  Medications Ordered Prior to Encounter[5]  Immunization History   Administered Date(s) Administered    COVID-19, mRNA, LNP-S, PF, 30 mcg/0.3 mL dose 02/26/2021, 03/26/2021, 12/21/2021    Flu vaccine (IIV4), preservative free *Check age/dose* 10/26/2016, 10/21/2021, 10/28/2022    Flu vaccine, trivalent, preservative free, age 6 months and greater (Fluarix/Fluzone/Flulaval) 10/24/2024    Hepatitis B vaccine, 18yrs and older(HEPLISAV) 04/01/2024, 05/01/2024    Influenza, Unspecified 10/19/2023    Influenza, injectable, quadrivalent 10/01/2020    Tdap vaccine, age 7 year and older (BOOSTRIX, ADACEL) 01/10/2015, 10/25/2016     Patient's medical history was reviewed and updated either before or during this encounter.       Nohemy Rowe, APRN-CNP         [1]   Past Medical History:  Diagnosis Date    Cutaneous lupus erythematosus     Patella dysplasia     Spondylosis    [2] History reviewed. No pertinent surgical history.  [3]   Family  History  Problem Relation Name Age of Onset    Breast cancer Maternal Grandmother Adrianne Lweis    [4]   Allergies  Allergen Reactions    Pollen Extracts Itching   [5]   Current Outpatient Medications on File Prior to Visit   Medication Sig Dispense Refill    albuterol 90 mcg/actuation inhaler every 4 hours.      fluticasone (Flonase Allergy Relief) 50 mcg/actuation nasal spray 1 spray in each nostril Nasally Once a day as needed      Evon Fe 1/20, 28, 1 mg-20 mcg (21)/75 mg (7) tablet Take 1 tablet by mouth once daily.      hydroxychloroquine (Plaquenil) 200 mg tablet TAKE ONE AND A HALF TABLETS ONCE DAILY      imiquimod (Aldara) 5 % cream ADMINISTER A SMALL AMOUNT CREAM IN PACKET TOP DAILY APPLY THREE NIGHTS WEEKLY (M/W/F)      loratadine (Claritin) 10 mg tablet 1 tablet Orally Once a day prn      multivit-min/iron/folic acid/K (ADULTS MULTIVITAMIN ORAL) once every 24 hours.      SUMAtriptan (Imitrex) 100 mg tablet 1 TABLET AT LEAST 2 HOURS BETWEEN DOSES AS NEEDED ORALLY TWICE A DAY AS NEEDED 90 DAYS 27 tablet 1    [DISCONTINUED] LYCOPENE ORAL Take 1 tablet by mouth once daily.       No current facility-administered medications on file prior to visit.

## 2025-04-28 ENCOUNTER — OFFICE VISIT (OUTPATIENT)
Dept: PRIMARY CARE | Facility: CLINIC | Age: 40
End: 2025-04-28
Payer: COMMERCIAL

## 2025-04-28 ENCOUNTER — OFFICE VISIT (OUTPATIENT)
Dept: SPORTS MEDICINE | Facility: CLINIC | Age: 40
End: 2025-04-28
Payer: COMMERCIAL

## 2025-04-28 VITALS
WEIGHT: 177 LBS | DIASTOLIC BLOOD PRESSURE: 78 MMHG | BODY MASS INDEX: 30.22 KG/M2 | HEIGHT: 64 IN | HEART RATE: 68 BPM | SYSTOLIC BLOOD PRESSURE: 118 MMHG

## 2025-04-28 VITALS
WEIGHT: 162 LBS | HEART RATE: 61 BPM | BODY MASS INDEX: 27.66 KG/M2 | SYSTOLIC BLOOD PRESSURE: 120 MMHG | DIASTOLIC BLOOD PRESSURE: 73 MMHG | OXYGEN SATURATION: 100 % | HEIGHT: 64 IN | TEMPERATURE: 97.7 F

## 2025-04-28 DIAGNOSIS — E78.00 PURE HYPERCHOLESTEROLEMIA: ICD-10-CM

## 2025-04-28 DIAGNOSIS — M76.52 PATELLAR TENDINITIS OF BOTH KNEES: ICD-10-CM

## 2025-04-28 DIAGNOSIS — L93.2 CUTANEOUS LUPUS ERYTHEMATOSUS: ICD-10-CM

## 2025-04-28 DIAGNOSIS — M22.8X1 PATELLAR MALTRACKING, RIGHT: ICD-10-CM

## 2025-04-28 DIAGNOSIS — M17.11 OSTEOARTHRITIS OF RIGHT KNEE, UNSPECIFIED OSTEOARTHRITIS TYPE: ICD-10-CM

## 2025-04-28 DIAGNOSIS — E55.9 VITAMIN D DEFICIENCY: ICD-10-CM

## 2025-04-28 DIAGNOSIS — R00.2 PALPITATIONS: ICD-10-CM

## 2025-04-28 DIAGNOSIS — M25.562 PAIN IN BOTH KNEES, UNSPECIFIED CHRONICITY: ICD-10-CM

## 2025-04-28 DIAGNOSIS — Z00.00 ANNUAL PHYSICAL EXAM: Primary | ICD-10-CM

## 2025-04-28 DIAGNOSIS — J45.20 MILD INTERMITTENT ASTHMA WITHOUT COMPLICATION (HHS-HCC): ICD-10-CM

## 2025-04-28 DIAGNOSIS — G43.829 MENSTRUAL MIGRAINE WITHOUT STATUS MIGRAINOSUS, NOT INTRACTABLE: ICD-10-CM

## 2025-04-28 DIAGNOSIS — M17.0 PRIMARY OSTEOARTHRITIS OF BOTH KNEES: ICD-10-CM

## 2025-04-28 DIAGNOSIS — M76.51 PATELLAR TENDINITIS OF BOTH KNEES: ICD-10-CM

## 2025-04-28 DIAGNOSIS — M71.21 POPLITEAL CYST, RIGHT: ICD-10-CM

## 2025-04-28 DIAGNOSIS — M25.561 PAIN IN BOTH KNEES, UNSPECIFIED CHRONICITY: ICD-10-CM

## 2025-04-28 DIAGNOSIS — M22.8X2 PATELLAR MALTRACKING, LEFT: ICD-10-CM

## 2025-04-28 DIAGNOSIS — M21.70 LEG LENGTH DISCREPANCY: ICD-10-CM

## 2025-04-28 PROBLEM — I49.3 PREMATURE VENTRICULAR CONTRACTIONS: Status: RESOLVED | Noted: 2024-03-22 | Resolved: 2025-04-28

## 2025-04-28 PROBLEM — E66.01 MORBID OBESITY (MULTI): Status: RESOLVED | Noted: 2024-03-22 | Resolved: 2025-04-28

## 2025-04-28 PROBLEM — I49.1 PAC (PREMATURE ATRIAL CONTRACTION): Status: RESOLVED | Noted: 2024-03-22 | Resolved: 2025-04-28

## 2025-04-28 PROCEDURE — 99396 PREV VISIT EST AGE 40-64: CPT | Performed by: NURSE PRACTITIONER

## 2025-04-28 PROCEDURE — 99214 OFFICE O/P EST MOD 30 MIN: CPT | Performed by: NURSE PRACTITIONER

## 2025-04-28 PROCEDURE — 3008F BODY MASS INDEX DOCD: CPT | Performed by: NURSE PRACTITIONER

## 2025-04-28 PROCEDURE — 1036F TOBACCO NON-USER: CPT | Performed by: NURSE PRACTITIONER

## 2025-04-28 ASSESSMENT — ENCOUNTER SYMPTOMS
PSYCHIATRIC NEGATIVE: 1
CONSTITUTIONAL NEGATIVE: 1
ENDOCRINE NEGATIVE: 1
LOSS OF SENSATION IN FEET: 0
OCCASIONAL FEELINGS OF UNSTEADINESS: 0
CARDIOVASCULAR NEGATIVE: 1
DEPRESSION: 0
ALLERGIC/IMMUNOLOGIC NEGATIVE: 1
EYES NEGATIVE: 1
GASTROINTESTINAL NEGATIVE: 1
RESPIRATORY NEGATIVE: 1
HEMATOLOGIC/LYMPHATIC NEGATIVE: 1

## 2025-04-28 ASSESSMENT — PROMIS GLOBAL HEALTH SCALE
RATE_SOCIAL_SATISFACTION: VERY GOOD
RATE_QUALITY_OF_LIFE: VERY GOOD
CARRYOUT_PHYSICAL_ACTIVITIES: MOSTLY
RATE_AVERAGE_PAIN: 2
RATE_AVERAGE_FATIGUE: MODERATE
RATE_PHYSICAL_HEALTH: VERY GOOD
RATE_MENTAL_HEALTH: VERY GOOD
EMOTIONAL_PROBLEMS: RARELY
CARRYOUT_SOCIAL_ACTIVITIES: GOOD
RATE_GENERAL_HEALTH: VERY GOOD

## 2025-04-28 ASSESSMENT — PAIN SCALES - GENERAL
PAINLEVEL_OUTOF10: 0-NO PAIN
PAINLEVEL_OUTOF10: 0-NO PAIN

## 2025-04-28 ASSESSMENT — PATIENT HEALTH QUESTIONNAIRE - PHQ9
1. LITTLE INTEREST OR PLEASURE IN DOING THINGS: NOT AT ALL
SUM OF ALL RESPONSES TO PHQ9 QUESTIONS 1 AND 2: 0
2. FEELING DOWN, DEPRESSED OR HOPELESS: NOT AT ALL

## 2025-04-29 NOTE — PROGRESS NOTES
Physical Therapy Treatment    Patient Name: Susana Ames  MRN: [id number]  Encounter date:  4/30/2025  Time Calculation  Start Time: 1430  Stop Time: 1513  Time Calculation (min): 43 min     PT Therapeutic Procedures Time Entry  Aquatic Therapy Time Entry: 43       Visit Number:  5 (including evaluation)  Planned total visits:  6  Visits Authorized/Insurance Coverage:  NO AUTH, 3300 DED, 90% COVERAGE, 40V PT/OT, 4000 OOP, MMO     Current Problem  Problem List Items Addressed This Visit    None  Visit Diagnoses         Codes      Pain in both knees, unspecified chronicity     M25.561, M25.562            Precautions   None    Pain   0/10    Subjective  Knees have been feeling very good. Pt only has some soreness if she is on her feet all day. She feels she can continue on her own after this visit.     Objective  No gait deviations on pool deck    Treatment:  Aquatics:  Walk x3 laps fwd, bwd, s/s across pool    4' depth, cues for TrA brace, x15 ea with green dumbbells   Heel raise  Hip abduction  Hip extension  Hip flexion/SLR  Hamstring curl  Mini squat    March across pool x3 laps dumbbell to knee   Fwd lunge x10 ea   SL sit to stand x10 ea     Deep, 5' with noodle under arms, BUE support:  Decompression x2'  Hip abd/adduction x2'  XC ski x2'  Bike x2'  SKTC x2'  Decompression x2'    Assessment:  Pt's response to treatment:  Pot performed all exercises with good form and without pain today. She has been functionally independent without significant pain recently. At this point she demonstrates ability to continue with independent exercise, will be placed on hold 30 days. Pt may return within that time if symptoms indicate need for continued care.     Pain end of session: 0/10    Plan:  Hold 30 days     Goals:   STG(3 visits)  Patient to tolerate 40 minutes of aquatic PT     LTG(6 visits)  WOMAC to < 5% impaired  Patient to perform ADLs/recreation  with pain < 2/10  Patient to walk for 1 hour on varied terrain without  pain <2/10

## 2025-04-30 ENCOUNTER — TREATMENT (OUTPATIENT)
Dept: PHYSICAL THERAPY | Facility: CLINIC | Age: 40
End: 2025-04-30
Payer: COMMERCIAL

## 2025-04-30 DIAGNOSIS — M25.561 PAIN IN BOTH KNEES, UNSPECIFIED CHRONICITY: ICD-10-CM

## 2025-04-30 DIAGNOSIS — M25.562 PAIN IN BOTH KNEES, UNSPECIFIED CHRONICITY: ICD-10-CM

## 2025-04-30 PROCEDURE — 97113 AQUATIC THERAPY/EXERCISES: CPT | Mod: GP

## 2025-08-08 ENCOUNTER — DOCUMENTATION (OUTPATIENT)
Dept: PHYSICAL THERAPY | Facility: CLINIC | Age: 40
End: 2025-08-08
Payer: COMMERCIAL

## 2025-08-08 NOTE — PROGRESS NOTES
Physical Therapy  Discharge Summary    Name: Susana Ames  MRN: [id number]  : 1985  Date of DC: 25  Date of initial evaluation: 3/26/25    Functional Status at Discharge: Safe and independent with ADLs and functional tasks    Rehab Discharge Reason: Achieved all and/or the most significant goals(s)    Discharge Plan: Home Program     Was this episode resolved in Epic: Yes